# Patient Record
Sex: MALE | Race: WHITE | Employment: UNEMPLOYED | URBAN - METROPOLITAN AREA
[De-identification: names, ages, dates, MRNs, and addresses within clinical notes are randomized per-mention and may not be internally consistent; named-entity substitution may affect disease eponyms.]

---

## 2024-10-30 ENCOUNTER — HOSPITAL ENCOUNTER (EMERGENCY)
Age: 24
Discharge: HOME | End: 2024-10-30
Attending: EMERGENCY MEDICINE

## 2024-10-30 VITALS
BODY MASS INDEX: 32.45 KG/M2 | WEIGHT: 261 LBS | OXYGEN SATURATION: 96 % | HEART RATE: 72 BPM | DIASTOLIC BLOOD PRESSURE: 87 MMHG | TEMPERATURE: 98.1 F | HEIGHT: 75 IN | RESPIRATION RATE: 16 BRPM | SYSTOLIC BLOOD PRESSURE: 134 MMHG

## 2024-10-30 DIAGNOSIS — R51.9 NONINTRACTABLE HEADACHE, UNSPECIFIED CHRONICITY PATTERN, UNSPECIFIED HEADACHE TYPE: Primary | ICD-10-CM

## 2024-10-30 PROCEDURE — 96374 THER/PROPH/DIAG INJ IV PUSH: CPT

## 2024-10-30 PROCEDURE — 96375 TX/PRO/DX INJ NEW DRUG ADDON: CPT

## 2024-10-30 PROCEDURE — 99284 EMERGENCY DEPT VISIT MOD MDM: CPT

## 2024-10-30 PROCEDURE — 6360000002 HC RX W HCPCS: Performed by: EMERGENCY MEDICINE

## 2024-10-30 RX ORDER — KETOROLAC TROMETHAMINE 15 MG/ML
15 INJECTION, SOLUTION INTRAMUSCULAR; INTRAVENOUS ONCE
Status: COMPLETED | OUTPATIENT
Start: 2024-10-30 | End: 2024-10-30

## 2024-10-30 RX ORDER — PROCHLORPERAZINE EDISYLATE 5 MG/ML
10 INJECTION INTRAMUSCULAR; INTRAVENOUS ONCE
Status: COMPLETED | OUTPATIENT
Start: 2024-10-30 | End: 2024-10-30

## 2024-10-30 RX ORDER — DIPHENHYDRAMINE HYDROCHLORIDE 50 MG/ML
25 INJECTION INTRAMUSCULAR; INTRAVENOUS ONCE
Status: COMPLETED | OUTPATIENT
Start: 2024-10-30 | End: 2024-10-30

## 2024-10-30 RX ADMIN — DIPHENHYDRAMINE HYDROCHLORIDE 25 MG: 50 INJECTION INTRAMUSCULAR; INTRAVENOUS at 14:00

## 2024-10-30 RX ADMIN — PROCHLORPERAZINE EDISYLATE 10 MG: 5 INJECTION INTRAMUSCULAR; INTRAVENOUS at 14:00

## 2024-10-30 RX ADMIN — KETOROLAC TROMETHAMINE 15 MG: 15 INJECTION, SOLUTION INTRAMUSCULAR; INTRAVENOUS at 14:00

## 2024-10-30 ASSESSMENT — PAIN - FUNCTIONAL ASSESSMENT: PAIN_FUNCTIONAL_ASSESSMENT: 0-10

## 2024-10-30 ASSESSMENT — ENCOUNTER SYMPTOMS
DIARRHEA: 0
PHOTOPHOBIA: 0
ABDOMINAL PAIN: 0
NAUSEA: 1
TROUBLE SWALLOWING: 0
SHORTNESS OF BREATH: 0
COLOR CHANGE: 0
COUGH: 0
VOMITING: 1

## 2024-10-30 ASSESSMENT — PAIN DESCRIPTION - LOCATION: LOCATION: HEAD

## 2024-10-30 ASSESSMENT — PAIN SCALES - GENERAL: PAINLEVEL_OUTOF10: 10

## 2024-10-30 ASSESSMENT — PAIN DESCRIPTION - DESCRIPTORS: DESCRIPTORS: ACHING

## 2024-10-30 NOTE — ED PROVIDER NOTES
EMERGENCY DEPARTMENT ENCOUNTER    Pt Name: Ambrocio Chi  MRN: [id number]  Birthdate 2000  Date of evaluation: 10/30/24  CHIEF COMPLAINT       Chief Complaint   Patient presents with    Nausea    Vomiting     Symptoms started yesterday.     Headache     HISTORY OF PRESENT ILLNESS   24-year-old male presenting to the ER complaining of nausea vomiting and a headache that started yesterday.  Patient does have a history of recurrent headaches that present like this.  Patient denies any underlying medical issues aside from psychiatric illnesses.    The history is provided by the patient.   Headache  Pain location:  Generalized  Associated symptoms: nausea and vomiting    Associated symptoms: no abdominal pain, no congestion, no cough, no diarrhea, no dizziness, no ear pain, no fatigue, no fever, no myalgias and no photophobia            REVIEW OF SYSTEMS     Review of Systems   Constitutional:  Negative for activity change, fatigue and fever.   HENT:  Negative for congestion, ear pain and trouble swallowing.    Eyes:  Negative for photophobia and visual disturbance.   Respiratory:  Negative for cough and shortness of breath.    Cardiovascular:  Negative for chest pain and palpitations.   Gastrointestinal:  Positive for nausea and vomiting. Negative for abdominal pain and diarrhea.   Genitourinary:  Negative for dysuria, flank pain and urgency.   Musculoskeletal:  Negative for arthralgias and myalgias.   Skin:  Negative for color change and rash.   Neurological:  Positive for headaches. Negative for dizziness and facial asymmetry.   Psychiatric/Behavioral:  Negative for agitation and behavioral problems.      PASTMEDICAL HISTORY   No past medical history on file.  Past Problem List  There is no problem list on file for this patient.    SURGICAL HISTORY     No past surgical history on file.  CURRENT MEDICATIONS       Previous Medications    No medications on file     ALLERGIES     has No Known Allergies.  FAMILY HISTORY

## 2024-10-30 NOTE — ED NOTES
Pt presenting to the ED with complaints of a headache. PT reports that he does get headaches \"often\"

## 2024-11-02 ENCOUNTER — APPOINTMENT (OUTPATIENT)
Dept: GENERAL RADIOLOGY | Age: 24
End: 2024-11-02

## 2024-11-02 ENCOUNTER — HOSPITAL ENCOUNTER (EMERGENCY)
Age: 24
Discharge: HOME | End: 2024-11-02
Attending: EMERGENCY MEDICINE

## 2024-11-02 VITALS
TEMPERATURE: 98.3 F | RESPIRATION RATE: 18 BRPM | DIASTOLIC BLOOD PRESSURE: 86 MMHG | SYSTOLIC BLOOD PRESSURE: 153 MMHG | HEART RATE: 90 BPM | OXYGEN SATURATION: 93 %

## 2024-11-02 DIAGNOSIS — J06.9 VIRAL URI: Primary | ICD-10-CM

## 2024-11-02 LAB
FLUAV RNA RESP QL NAA+PROBE: NOT DETECTED
FLUBV RNA RESP QL NAA+PROBE: NOT DETECTED
SARS-COV-2 RNA RESP QL NAA+PROBE: NOT DETECTED
SOURCE: NORMAL
SPECIMEN DESCRIPTION: NORMAL
SPECIMEN SOURCE: NORMAL
STREP A, MOLECULAR: NEGATIVE

## 2024-11-02 PROCEDURE — 6360000002 HC RX W HCPCS: Performed by: EMERGENCY MEDICINE

## 2024-11-02 PROCEDURE — 87651 STREP A DNA AMP PROBE: CPT

## 2024-11-02 PROCEDURE — 99284 EMERGENCY DEPT VISIT MOD MDM: CPT

## 2024-11-02 PROCEDURE — 6370000000 HC RX 637 (ALT 250 FOR IP): Performed by: EMERGENCY MEDICINE

## 2024-11-02 PROCEDURE — 71045 X-RAY EXAM CHEST 1 VIEW: CPT

## 2024-11-02 PROCEDURE — 87636 SARSCOV2 & INF A&B AMP PRB: CPT

## 2024-11-02 RX ORDER — NICOTINE 21 MG/24HR
1 PATCH, TRANSDERMAL 24 HOURS TRANSDERMAL ONCE
Status: DISCONTINUED | OUTPATIENT
Start: 2024-11-02 | End: 2024-11-02 | Stop reason: HOSPADM

## 2024-11-02 RX ORDER — LIDOCAINE 4 G/G
1 PATCH TOPICAL DAILY
Status: DISCONTINUED | OUTPATIENT
Start: 2024-11-02 | End: 2024-11-02 | Stop reason: HOSPADM

## 2024-11-02 RX ORDER — DEXAMETHASONE SODIUM PHOSPHATE 10 MG/ML
10 INJECTION, SOLUTION INTRAMUSCULAR; INTRAVENOUS ONCE
Status: COMPLETED | OUTPATIENT
Start: 2024-11-02 | End: 2024-11-02

## 2024-11-02 RX ADMIN — DEXAMETHASONE SODIUM PHOSPHATE 10 MG: 10 INJECTION, SOLUTION INTRAMUSCULAR; INTRAVENOUS at 10:14

## 2024-11-02 ASSESSMENT — PAIN - FUNCTIONAL ASSESSMENT: PAIN_FUNCTIONAL_ASSESSMENT: 0-10

## 2024-11-02 ASSESSMENT — PAIN SCALES - GENERAL: PAINLEVEL_OUTOF10: 10

## 2024-11-02 NOTE — DISCHARGE INSTRUCTIONS
Keep self well-hydrated, use Tylenol and ibuprofen as needed.  If you have any concerning symptoms come back to the ER.

## 2024-11-02 NOTE — ED PROVIDER NOTES
EMERGENCY DEPARTMENT ENCOUNTER    Pt Name: Ambrocio Hill  MRN: [id number]  Birthdate 2000  Date of evaluation: 11/2/24  CHIEF COMPLAINT       Chief Complaint   Patient presents with    Nasal Congestion    Pain     Pt c/o all over body pain from being homeless until 2 weeks ago. Pt at [facility] and does not want to be there anymore. Pt wants to go to [facility] for rehab.      HISTORY OF PRESENT ILLNESS   HPI  24-year-old male with a history of polysubstance abuse currently in recovery presents to the ED for body aches, sore throat, occasionally productive cough for the past few days.  He denies chest pain, shortness of breath, fever/chills, nausea/vomiting or any other acute complaints.    REVIEW OF SYSTEMS     Review of Systems   All other systems reviewed and are negative.    PASTMEDICAL HISTORY   History reviewed. No pertinent past medical history.  SURGICAL HISTORY     History reviewed. No pertinent surgical history.  CURRENT MEDICATIONS     There are no discharge medications for this patient.    ALLERGIES     is allergic to benadryl [diphenhydramine].  FAMILY HISTORY     has no family status information on file.      SOCIAL HISTORY       Social History     Tobacco Use    Smoking status: Every Day     Types: Cigarettes    Smokeless tobacco: Current   Substance Use Topics    Drug use: Not Currently     Types: Cocaine, Methamphetamines (Crystal Meth), Opiates , Marijuana (Weed), IV     Comment: last used November 2023     PHYSICAL EXAM     INITIAL VITALS: BP (!) 153/86   Pulse 90   Temp 98.3 °F (36.8 °C)   Resp 18   SpO2 93%    Physical Exam  Constitutional:       General: He is not in acute distress.     Appearance: Normal appearance. He is normal weight. He is not ill-appearing.   HENT:      Head: Normocephalic and atraumatic.      Right Ear: Tympanic membrane normal.      Left Ear: Tympanic membrane normal.      Nose: Nose normal. No rhinorrhea.      Mouth/Throat:      Mouth: Mucous membranes are moist.

## 2024-11-30 ENCOUNTER — APPOINTMENT (OUTPATIENT)
Dept: GENERAL RADIOLOGY | Age: 24
End: 2024-11-30
Payer: MEDICAID

## 2024-11-30 ENCOUNTER — HOSPITAL ENCOUNTER (EMERGENCY)
Age: 24
Discharge: HOME | End: 2024-11-30
Attending: EMERGENCY MEDICINE
Payer: MEDICAID

## 2024-11-30 VITALS
DIASTOLIC BLOOD PRESSURE: 81 MMHG | TEMPERATURE: 97.4 F | WEIGHT: 260 LBS | SYSTOLIC BLOOD PRESSURE: 151 MMHG | HEART RATE: 63 BPM | RESPIRATION RATE: 19 BRPM | OXYGEN SATURATION: 98 % | BODY MASS INDEX: 32.5 KG/M2

## 2024-11-30 DIAGNOSIS — B35.3 TINEA PEDIS OF BOTH FEET: ICD-10-CM

## 2024-11-30 DIAGNOSIS — R07.89 CHEST DISCOMFORT: Primary | ICD-10-CM

## 2024-11-30 LAB
BILIRUB UR QL STRIP: NEGATIVE
CLARITY UR: CLEAR
COLOR UR: YELLOW
COMMENT: ABNORMAL
GLUCOSE UR STRIP-MCNC: NEGATIVE MG/DL
HGB UR QL STRIP.AUTO: NEGATIVE
KETONES UR STRIP-MCNC: NEGATIVE MG/DL
LEUKOCYTE ESTERASE UR QL STRIP: NEGATIVE
NITRITE UR QL STRIP: NEGATIVE
PH UR STRIP: 6 [PH] (ref 5–8)
PROT UR STRIP-MCNC: NEGATIVE MG/DL
SP GR UR STRIP: 1 (ref 1–1.03)
UROBILINOGEN UR STRIP-ACNC: NORMAL EU/DL (ref 0–1)

## 2024-11-30 PROCEDURE — 93005 ELECTROCARDIOGRAM TRACING: CPT

## 2024-11-30 PROCEDURE — 99285 EMERGENCY DEPT VISIT HI MDM: CPT

## 2024-11-30 PROCEDURE — 6360000002 HC RX W HCPCS

## 2024-11-30 PROCEDURE — 81003 URINALYSIS AUTO W/O SCOPE: CPT

## 2024-11-30 PROCEDURE — 6370000000 HC RX 637 (ALT 250 FOR IP)

## 2024-11-30 PROCEDURE — 71046 X-RAY EXAM CHEST 2 VIEWS: CPT

## 2024-11-30 RX ORDER — ACETAMINOPHEN 500 MG
1000 TABLET ORAL ONCE
Status: COMPLETED | OUTPATIENT
Start: 2024-11-30 | End: 2024-11-30

## 2024-11-30 RX ORDER — DEXAMETHASONE 4 MG/1
4 TABLET ORAL ONCE
Status: COMPLETED | OUTPATIENT
Start: 2024-11-30 | End: 2024-11-30

## 2024-11-30 RX ORDER — CLOTRIMAZOLE 1 %
CREAM (GRAM) TOPICAL
Qty: 28 G | Refills: 0 | Status: SHIPPED | OUTPATIENT
Start: 2024-11-30 | End: 2024-12-07

## 2024-11-30 RX ORDER — MICONAZOLE NITRATE 20 MG/G
CREAM TOPICAL 2 TIMES DAILY
Status: DISCONTINUED | OUTPATIENT
Start: 2024-11-30 | End: 2024-11-30 | Stop reason: HOSPADM

## 2024-11-30 RX ORDER — CLOTRIMAZOLE 1 %
CREAM (GRAM) TOPICAL 2 TIMES DAILY
Status: DISCONTINUED | OUTPATIENT
Start: 2024-11-30 | End: 2024-11-30 | Stop reason: SDUPTHER

## 2024-11-30 RX ADMIN — MICONAZOLE NITRATE: 20 CREAM TOPICAL at 20:12

## 2024-11-30 RX ADMIN — ACETAMINOPHEN 1000 MG: 500 TABLET ORAL at 19:02

## 2024-11-30 RX ADMIN — DEXAMETHASONE 4 MG: 4 TABLET ORAL at 19:02

## 2024-11-30 ASSESSMENT — PAIN DESCRIPTION - LOCATION: LOCATION: SHOULDER

## 2024-11-30 ASSESSMENT — PAIN DESCRIPTION - PAIN TYPE: TYPE: ACUTE PAIN

## 2024-11-30 ASSESSMENT — PAIN DESCRIPTION - DESCRIPTORS: DESCRIPTORS: SORE

## 2024-11-30 ASSESSMENT — PAIN SCALES - GENERAL: PAINLEVEL_OUTOF10: 5

## 2024-11-30 ASSESSMENT — PAIN DESCRIPTION - FREQUENCY: FREQUENCY: CONTINUOUS

## 2024-11-30 ASSESSMENT — PAIN DESCRIPTION - ORIENTATION: ORIENTATION: LEFT

## 2024-11-30 ASSESSMENT — PAIN - FUNCTIONAL ASSESSMENT: PAIN_FUNCTIONAL_ASSESSMENT: 0-10

## 2024-11-30 NOTE — ED PROVIDER NOTES
[facility] ED  Emergency Department Encounter  Emergency Medicine Resident     Pt Name:Ambrocio Hill  MRN: [id number]  Birthdate 2000  Date of evaluation: 11/30/24  PCP:  No primary care provider on file.  Note Started: 6:37 PM EST      CHIEF COMPLAINT       Chief Complaint   Patient presents with   • Shoulder Pain   • Rash       HISTORY OF PRESENT ILLNESS  (Location/Symptom, Timing/Onset, Context/Setting, Quality, Duration, Modifying Factors, Severity.)      Ambrocio Hill is a 24-year-old male who presents to the ED with complaints not consistent with his triage complaints.  Patient initially stated he was having shoulder pain and a rash but when physician was in the room, patient was complaining of generalized fatigue for the last several weeks and some shortness of breath with intermittent chest pain.  Patient has no cardiac history.  Denies any sharp acute chest pain or sudden onset shortness of breath.  Patient denies any recent sick contacts.  Denies any rash or change in skin color.  Denies any acute headache, vision changes, diaphoresis, fever, chills, nausea, vomiting, abdominal pain.  Patient did endorse mild dysuria symptoms.    Chart review shows the patient has home    PAST MEDICAL / SURGICAL / SOCIAL / FAMILY HISTORY      has no past medical history on file.       has no past surgical history on file.      Social History     Socioeconomic History   • Marital status: Single     Spouse name: Not on file   • Number of children: Not on file   • Years of education: Not on file   • Highest education level: Not on file   Occupational History   • Not on file   Tobacco Use   • Smoking status: Every Day     Types: Cigarettes   • Smokeless tobacco: Current   Substance and Sexual Activity   • Alcohol use: Not on file   • Drug use: Not Currently     Types: Cocaine, Methamphetamines (Crystal Meth), Opiates , Marijuana (Weed), IV     Comment: last used November 2023   • Sexual activity: Not on  of breath with intermittent chest pain.  Patient has no cardiac history.  Denies any sharp acute chest pain or sudden onset shortness of breath.  Patient denies any recent sick contacts.  Denies any rash or change in skin color.  Denies any acute headache, vision changes, diaphoresis, fever, chills, nausea, vomiting, abdominal pain.  Patient did endorse mild dysuria symptoms.    Chart review shows the patient has home    Physical Exam: GCS 15, A and O x 4, VSS except for mildly elevated systolic blood pressure 141/81.  Afebrile.  No acute distress.  Nontoxic-appearing.  Resting comfortably in bed.  Cranial nerves grossly intact.  No rash on visible skin.  Heart has a regular rate and rhythm.  Lungs are clear to auscultation.  Abdomen: S, ND, NTTP.  Palpable radial DP pulses.  No pitting edema.  No midline C/T or L-spine TTP.  No CVA TTP     Concern for: EKG abnormalities, pneumonia, reactive airway disease,, rash, viral URI    Plan/Impression: Will do an EKG and chest x-ray.  As patient has no cardiac history, low likelihood given patient's age that he has an acute coronary process.  Will give Tylenol for pain control.  Will also give a dose of Decadron for any itching.   [AS]   1937 On attending's evaluation, noted at least foot.  Will give clotrimazole cream here for patient to go home with and apply. [AS]   1948 FINDINGS:  Patient is slightly rotated.  Cardiomediastinal silhouette and pulmonary  vasculature are normal.  Suspected mild scarring in both lungs.  No  consolidation, pleural effusion, or pneumothorax.     IMPRESSION:  No acute abnormality.   [AS]   2046 Leukocyte Esterase, Urine: NEGATIVE [AS]   2046 Nitrite, Urine: NEGATIVE  Urinalysis negative for UTI.  Will discharge patient. [AS]      ED Course User Index  [AS] Ambrocio Byrnes DO       PROCEDURES:      CONSULTS:  None    CRITICAL CARE:  There was significant risk of life threatening deterioration of patient's condition requiring my direct

## 2024-11-30 NOTE — ED TRIAGE NOTES
Pt to ED c/o left shoulder pain and swelling and a rash to his bilateral feet x2 weeks. Pt denies any injury to his shoulder and states that it is stiff and feels like \"the lymph nodes are swollen.\" Pt states that he has been trying to use ibuprofen for his symptoms without relief.

## 2024-12-01 LAB
EKG ATRIAL RATE: 54 BPM
EKG P AXIS: 28 DEGREES
EKG P-R INTERVAL: 158 MS
EKG Q-T INTERVAL: 416 MS
EKG QRS DURATION: 88 MS
EKG QTC CALCULATION (BAZETT): 394 MS
EKG R AXIS: 15 DEGREES
EKG T AXIS: 19 DEGREES
EKG VENTRICULAR RATE: 54 BPM

## 2024-12-01 NOTE — ED PROVIDER NOTES
[facility]     Emergency Department     Faculty Attestation    I performed a history and physical examination of the patient and discussed management with the resident. I reviewed the resident’s note and agree with the documented findings including all diagnostic interpretations and plan of care. Any areas of disagreement are noted on the chart. I was personally present for the key portions of any procedures. I have documented in the chart those procedures where I was not present during the key portions. I have reviewed the emergency nurses triage note. I agree with the chief complaint, past medical history, past surgical history, allergies, medications, social and family history as documented unless otherwise noted below. Documentation of the HPI, Physical Exam and Medical Decision Making performed by prema is based on my personal performance of the HPI, PE and MDM. For Physician Assistant/ Nurse Practitioner cases/documentation I have personally evaluated this patient and have completed at least one if not all key elements of the E/M (history, physical exam, and MDM). Additional findings are as noted.    Primary Care Physician: No primary care provider on file.    Note Started: 8:50 PM EST     VITAL SIGNS:   weight is 117.9 kg (260 lb). His oral temperature is 97.4 °F (36.3 °C). His blood pressure is 151/81 (abnormal) and his pulse is 63. His respiration is 19 and oxygen saturation is 98%.      Medical Decision Making  Left shoulder/chest discomfort.  Reports has been ongoing for 2 weeks.  No nausea no vomiting no shortness of breath.  No direct trauma.  No increased pain with range of motion.  Neurovascular intact to the arm.  He also complains of discomfort in the feet bilaterally.  There is some erythema and scaling of the skin to suggest tinea pedis.  EKG, chest x-ray, clotrimazole    Amount and/or Complexity of Data Reviewed  Labs: ordered.

## 2024-12-01 NOTE — ED NOTES
Report received from   Marisol ANGULO    Pt resting in bed. NAD noted.   Waiting further orders to plan of care

## 2024-12-01 NOTE — DISCHARGE INSTRUCTIONS
-You were seen and evaluated by emergency medicine physicians at [facility].    -Please follow-up with your primary care physician and/or with the referrals to specialist.    -You were diagnosed with: Chest discomfort, athlete's foot    -Lab work was negative.  No signs of abnormal electrical changes on EKG.  Chest x-ray was normal.  You will be discharged with a prescription of Lotrimin cream for the athlete's foot.  Please apply as directed.  Please follow-up with your primary care physician within 1 week.  If you do not have 1, please use the referral.    -Please return to the Emergency Department if you are experiencing the following symptoms acutely: Headache, fever, chills, nausea, vomiting, chest pain, shortness of breath, abdominal pain, change with urination, change with bowel movements, change in your skin/hair/nail, weakness, fatigue, altered mental status and/or any change from baseline health.    -Thank you for coming to [facility].    
no

## 2024-12-02 PROCEDURE — 93010 ELECTROCARDIOGRAM REPORT: CPT | Performed by: INTERNAL MEDICINE

## 2025-01-27 ENCOUNTER — HOSPITAL ENCOUNTER (EMERGENCY)
Age: 25
Discharge: HOME | End: 2025-01-27
Attending: EMERGENCY MEDICINE
Payer: MEDICAID

## 2025-01-27 VITALS
DIASTOLIC BLOOD PRESSURE: 96 MMHG | RESPIRATION RATE: 18 BRPM | OXYGEN SATURATION: 97 % | TEMPERATURE: 97.3 F | HEART RATE: 88 BPM | SYSTOLIC BLOOD PRESSURE: 147 MMHG

## 2025-01-27 DIAGNOSIS — J02.9 VIRAL PHARYNGITIS: Primary | ICD-10-CM

## 2025-01-27 PROCEDURE — 6370000000 HC RX 637 (ALT 250 FOR IP)

## 2025-01-27 PROCEDURE — 99283 EMERGENCY DEPT VISIT LOW MDM: CPT

## 2025-01-27 RX ORDER — ACETAMINOPHEN 500 MG
500 TABLET ORAL 4 TIMES DAILY PRN
Qty: 40 TABLET | Refills: 0 | Status: SHIPPED | OUTPATIENT
Start: 2025-01-27 | End: 2025-02-06

## 2025-01-27 RX ORDER — HYDROXYZINE HYDROCHLORIDE 25 MG/1
25 TABLET, FILM COATED ORAL
COMMUNITY
Start: 2024-11-05 | End: 2025-01-27

## 2025-01-27 RX ORDER — CETIRIZINE HYDROCHLORIDE 10 MG/1
10 TABLET ORAL ONCE
Status: COMPLETED | OUTPATIENT
Start: 2025-01-27 | End: 2025-01-27

## 2025-01-27 RX ORDER — PRAZOSIN HYDROCHLORIDE 1 MG/1
1 CAPSULE ORAL
COMMUNITY
Start: 2024-11-11

## 2025-01-27 RX ORDER — LAMOTRIGINE 25 MG/1
25 TABLET ORAL
COMMUNITY
Start: 2024-11-11

## 2025-01-27 RX ORDER — QUETIAPINE FUMARATE 300 MG/1
300 TABLET, FILM COATED ORAL
COMMUNITY
Start: 2024-11-11

## 2025-01-27 RX ORDER — CETIRIZINE HYDROCHLORIDE 10 MG/1
10 TABLET ORAL DAILY
Qty: 30 TABLET | Refills: 0 | Status: SHIPPED | OUTPATIENT
Start: 2025-01-27

## 2025-01-27 RX ORDER — IBUPROFEN 400 MG/1
400 TABLET, FILM COATED ORAL ONCE
Status: COMPLETED | OUTPATIENT
Start: 2025-01-27 | End: 2025-01-27

## 2025-01-27 RX ORDER — ARIPIPRAZOLE 400 MG
400 KIT INTRAMUSCULAR
COMMUNITY
Start: 2024-11-07

## 2025-01-27 RX ORDER — ACETAMINOPHEN 500 MG
1000 TABLET ORAL ONCE
Status: COMPLETED | OUTPATIENT
Start: 2025-01-27 | End: 2025-01-27

## 2025-01-27 RX ORDER — IBUPROFEN 200 MG
600 TABLET ORAL EVERY 8 HOURS PRN
Qty: 30 TABLET | Refills: 0 | Status: SHIPPED | OUTPATIENT
Start: 2025-01-27

## 2025-01-27 RX ADMIN — BENZOCAINE AND MENTHOL 1 LOZENGE: 15; 3.6 LOZENGE ORAL at 04:51

## 2025-01-27 RX ADMIN — ACETAMINOPHEN 1000 MG: 500 TABLET ORAL at 04:51

## 2025-01-27 RX ADMIN — IBUPROFEN 400 MG: 400 TABLET, FILM COATED ORAL at 04:51

## 2025-01-27 RX ADMIN — CETIRIZINE HYDROCHLORIDE 10 MG: 10 TABLET, FILM COATED ORAL at 04:51

## 2025-01-27 ASSESSMENT — PAIN - FUNCTIONAL ASSESSMENT: PAIN_FUNCTIONAL_ASSESSMENT: 0-10

## 2025-01-27 ASSESSMENT — PAIN SCALES - GENERAL: PAINLEVEL_OUTOF10: 8

## 2025-01-27 NOTE — ED PROVIDER NOTES
[facility]   Emergency Department  Faculty Attestation       I performed a history and physical examination of the patient and discussed management with the resident. I reviewed the resident’s note and agree with the documented findings including all diagnostic interpretations and plan of care. Any areas of disagreement are noted on the chart. I was personally present for the key portions of any procedures. I have documented in the chart those procedures where I was not present during the key portions. I have reviewed the emergency nurses triage note. I agree with the chief complaint, past medical history, past surgical history, allergies, medications, social and family history as documented unless otherwise noted below.  For Physician Assistant/ Nurse Practitioner cases/documentation I have personally evaluated this patient and have completed at least one if not all key elements of the E/M (history, physical exam, and MDM). Additional findings are as noted.    Patient Name: Ambrocio Hill  MRN: [id number]  : 2000  Primary Care Physician: No primary care provider on file.    Date of evaluationa: 2025   Note Started: 4:56 AM EST    Pertinent Comments     Chief Complaint:   Chief Complaint   Patient presents with    Pharyngitis    Cough        Initial vitals: (If not listed, please see nursing documentation)  ED Triage Vitals   BP Systolic BP Percentile Diastolic BP Percentile Temp Temp Source Pulse Respirations SpO2   25 0349 -- -- 25 0347 25 0347 25 0349 25 0347 25 0349   (!) 147/96   97.8 °F (36.6 °C) Axillary 88 18 97 %      Height Weight         -- --                        HPI/PE/Impression:  This is a 24 y.o. male who presents to the Emergency Department Sore throat and cough for several days.  On examination, he is awake alert answering questions.  Posterior pharynx is mild erythema but no exudate.  No cervical adenopathy.  Heart sounds regular.  
(CEPACOL) 6-10 MG LOZG lozenge Take 1 lozenge by mouth every 2 hours as needed for Sore Throat, Disp-30 lozenge, R-0Normal      cetirizine (ZYRTEC) 10 MG tablet Take 1 tablet by mouth daily, Disp-30 tablet, R-0Normal      ibuprofen (ADVIL;MOTRIN) 200 MG tablet Take 3 tablets by mouth every 8 hours as needed for Pain or Fever, Disp-30 tablet, R-0Normal             Ambrocio Byrnes DO  Emergency Medicine Resident    (Please note that portions of this note were completed with a voice recognition program.  Efforts were made to edit the dictations but occasionally words are mis-transcribed.)

## 2025-01-27 NOTE — DISCHARGE INSTRUCTIONS
-You were seen and evaluated by emergency medicine physicians at [facility].    -Please follow-up with your primary care physician and/or with the referrals to specialist.    -You were diagnosed with: Viral pharyngitis    -Physical exam showed concerns for a viral infection of your throat.  You were given symptomatic control.  Viral infections typically run between 7 to 10 days.  Prescriptions of medications for symptom control have been sent to your pharmacy.  Please follow-up with your primary care physician.  If you do not have 1, please use the referral.    -Please return to the Emergency Department if you are experiencing the following symptoms acutely: Sore throat, difficulty swallowing, difficulty tolerating your own secretions, swelling of your neck, difficulty breathing, stridor, Headache, fever, chills, nausea, vomiting, chest pain, shortness of breath, abdominal pain, change with urination, change with bowel movements, change in your skin/hair/nail, weakness, fatigue, altered mental status and/or any change from baseline health.    -Thank you for coming to [facility].

## 2025-01-27 NOTE — ED NOTES
Pt. Presents to the ED for a sore throat and cough. Pt has no other complaints. Resp even and non labored. Pt a&ox4. Resident at the bedside for evaluation.

## 2025-02-14 ENCOUNTER — HOSPITAL ENCOUNTER (INPATIENT)
Age: 25
LOS: 5 days | Discharge: HOME | DRG: 761 | End: 2025-02-19
Attending: EMERGENCY MEDICINE | Admitting: PSYCHIATRY & NEUROLOGY
Payer: MEDICAID

## 2025-02-14 DIAGNOSIS — F32.A DEPRESSION WITH SUICIDAL IDEATION: Primary | ICD-10-CM

## 2025-02-14 DIAGNOSIS — R45.851 DEPRESSION WITH SUICIDAL IDEATION: Primary | ICD-10-CM

## 2025-02-14 DIAGNOSIS — F19.10 POLYSUBSTANCE ABUSE (HCC): ICD-10-CM

## 2025-02-14 PROBLEM — F23 ACUTE PSYCHOSIS (HCC): Status: ACTIVE | Noted: 2025-02-14

## 2025-02-14 LAB
ALBUMIN SERPL-MCNC: 4.5 G/DL (ref 3.5–5.2)
ALP SERPL-CCNC: 116 U/L (ref 40–129)
ALT SERPL-CCNC: 63 U/L (ref 10–50)
AMPHET UR QL SCN: NEGATIVE
ANION GAP SERPL CALCULATED.3IONS-SCNC: 12 MMOL/L (ref 9–16)
AST SERPL-CCNC: 36 U/L (ref 10–50)
BARBITURATES UR QL SCN: NEGATIVE
BASOPHILS # BLD: 0.1 K/UL (ref 0–0.2)
BASOPHILS NFR BLD: 1 % (ref 0–2)
BENZODIAZ UR QL: NEGATIVE
BILIRUB SERPL-MCNC: 0.5 MG/DL (ref 0–1.2)
BUN SERPL-MCNC: 12 MG/DL (ref 6–20)
CALCIUM SERPL-MCNC: 9.5 MG/DL (ref 8.6–10.4)
CANNABINOIDS UR QL SCN: POSITIVE
CHLORIDE SERPL-SCNC: 103 MMOL/L (ref 98–107)
CO2 SERPL-SCNC: 26 MMOL/L (ref 20–31)
COCAINE UR QL SCN: NEGATIVE
CREAT SERPL-MCNC: 0.9 MG/DL (ref 0.7–1.2)
EOSINOPHIL # BLD: 0.2 K/UL (ref 0–0.4)
EOSINOPHILS RELATIVE PERCENT: 2 % (ref 0–4)
ERYTHROCYTE [DISTWIDTH] IN BLOOD BY AUTOMATED COUNT: 14.7 % (ref 11.5–14.9)
ETHANOL PERCENT: NORMAL %
ETHANOLAMINE SERPL-MCNC: <10 MG/DL (ref 0–0.08)
FENTANYL UR QL: NEGATIVE
GFR, ESTIMATED: >90 ML/MIN/1.73M2
GLUCOSE SERPL-MCNC: 100 MG/DL (ref 74–99)
HCT VFR BLD AUTO: 46.9 % (ref 41–53)
HGB BLD-MCNC: 16 G/DL (ref 13.5–17.5)
LYMPHOCYTES NFR BLD: 1.5 K/UL (ref 1–4.8)
LYMPHOCYTES RELATIVE PERCENT: 12 % (ref 24–44)
MCH RBC QN AUTO: 29 PG (ref 26–34)
MCHC RBC AUTO-ENTMCNC: 34.2 G/DL (ref 31–37)
MCV RBC AUTO: 84.9 FL (ref 80–100)
METHADONE UR QL: NEGATIVE
MONOCYTES NFR BLD: 1.2 K/UL (ref 0.1–1.3)
MONOCYTES NFR BLD: 10 % (ref 1–7)
NEUTROPHILS NFR BLD: 75 % (ref 36–66)
NEUTS SEG NFR BLD: 8.8 K/UL (ref 1.3–9.1)
OPIATES UR QL SCN: NEGATIVE
OXYCODONE UR QL SCN: NEGATIVE
PCP UR QL SCN: NEGATIVE
PLATELET # BLD AUTO: 275 K/UL (ref 150–450)
PMV BLD AUTO: 7.3 FL (ref 6–12)
POTASSIUM SERPL-SCNC: 4 MMOL/L (ref 3.7–5.3)
PROT SERPL-MCNC: 7.3 G/DL (ref 6.6–8.7)
RBC # BLD AUTO: 5.53 M/UL (ref 4.5–5.9)
SODIUM SERPL-SCNC: 141 MMOL/L (ref 136–145)
TEST INFORMATION: ABNORMAL
WBC OTHER # BLD: 11.8 K/UL (ref 3.5–11)

## 2025-02-14 PROCEDURE — 36415 COLL VENOUS BLD VENIPUNCTURE: CPT

## 2025-02-14 PROCEDURE — 99285 EMERGENCY DEPT VISIT HI MDM: CPT

## 2025-02-14 PROCEDURE — 80053 COMPREHEN METABOLIC PANEL: CPT

## 2025-02-14 PROCEDURE — 1240000000 HC EMOTIONAL WELLNESS R&B

## 2025-02-14 PROCEDURE — 6370000000 HC RX 637 (ALT 250 FOR IP): Performed by: PSYCHIATRY & NEUROLOGY

## 2025-02-14 PROCEDURE — G0480 DRUG TEST DEF 1-7 CLASSES: HCPCS

## 2025-02-14 PROCEDURE — 80307 DRUG TEST PRSMV CHEM ANLYZR: CPT

## 2025-02-14 PROCEDURE — 85025 COMPLETE CBC W/AUTO DIFF WBC: CPT

## 2025-02-14 RX ORDER — HALOPERIDOL 5 MG/1
5 TABLET ORAL EVERY 6 HOURS PRN
Status: DISCONTINUED | OUTPATIENT
Start: 2025-02-14 | End: 2025-02-19 | Stop reason: HOSPADM

## 2025-02-14 RX ORDER — QUETIAPINE FUMARATE 300 MG/1
300 TABLET, FILM COATED ORAL NIGHTLY
Status: DISCONTINUED | OUTPATIENT
Start: 2025-02-14 | End: 2025-02-15

## 2025-02-14 RX ORDER — POLYETHYLENE GLYCOL 3350 17 G
2 POWDER IN PACKET (EA) ORAL
Status: DISCONTINUED | OUTPATIENT
Start: 2025-02-14 | End: 2025-02-15

## 2025-02-14 RX ORDER — ACETAMINOPHEN 325 MG/1
650 TABLET ORAL EVERY 6 HOURS PRN
Status: DISCONTINUED | OUTPATIENT
Start: 2025-02-14 | End: 2025-02-19 | Stop reason: HOSPADM

## 2025-02-14 RX ORDER — POLYETHYLENE GLYCOL 3350 17 G/17G
17 POWDER, FOR SOLUTION ORAL DAILY PRN
Status: DISCONTINUED | OUTPATIENT
Start: 2025-02-14 | End: 2025-02-19 | Stop reason: HOSPADM

## 2025-02-14 RX ORDER — TRAZODONE HYDROCHLORIDE 50 MG/1
50 TABLET ORAL NIGHTLY PRN
Status: DISCONTINUED | OUTPATIENT
Start: 2025-02-15 | End: 2025-02-19 | Stop reason: HOSPADM

## 2025-02-14 RX ORDER — HYDROXYZINE HYDROCHLORIDE 50 MG/1
50 TABLET, FILM COATED ORAL 3 TIMES DAILY PRN
Status: DISCONTINUED | OUTPATIENT
Start: 2025-02-14 | End: 2025-02-19 | Stop reason: HOSPADM

## 2025-02-14 RX ORDER — MAGNESIUM HYDROXIDE/ALUMINUM HYDROXICE/SIMETHICONE 120; 1200; 1200 MG/30ML; MG/30ML; MG/30ML
30 SUSPENSION ORAL EVERY 6 HOURS PRN
Status: DISCONTINUED | OUTPATIENT
Start: 2025-02-14 | End: 2025-02-19 | Stop reason: HOSPADM

## 2025-02-14 RX ORDER — IBUPROFEN 400 MG/1
400 TABLET, FILM COATED ORAL EVERY 6 HOURS PRN
Status: DISCONTINUED | OUTPATIENT
Start: 2025-02-14 | End: 2025-02-19 | Stop reason: HOSPADM

## 2025-02-14 RX ORDER — LORAZEPAM 1 MG/1
2 TABLET ORAL EVERY 6 HOURS PRN
Status: DISCONTINUED | OUTPATIENT
Start: 2025-02-14 | End: 2025-02-19 | Stop reason: HOSPADM

## 2025-02-14 RX ADMIN — QUETIAPINE FUMARATE 300 MG: 300 TABLET ORAL at 22:08

## 2025-02-14 RX ADMIN — NICOTINE POLACRILEX 2 MG: 2 LOZENGE ORAL at 22:08

## 2025-02-14 ASSESSMENT — PAIN DESCRIPTION - ORIENTATION: ORIENTATION: RIGHT;LEFT

## 2025-02-14 ASSESSMENT — PAIN DESCRIPTION - LOCATION: LOCATION: FOOT

## 2025-02-14 ASSESSMENT — PATIENT HEALTH QUESTIONNAIRE - PHQ9
SUM OF ALL RESPONSES TO PHQ9 QUESTIONS 1 & 2: 2
SUM OF ALL RESPONSES TO PHQ QUESTIONS 1-9: 2
1. LITTLE INTEREST OR PLEASURE IN DOING THINGS: SEVERAL DAYS
SUM OF ALL RESPONSES TO PHQ QUESTIONS 1-9: 2
2. FEELING DOWN, DEPRESSED OR HOPELESS: SEVERAL DAYS

## 2025-02-14 ASSESSMENT — LIFESTYLE VARIABLES
HOW MANY STANDARD DRINKS CONTAINING ALCOHOL DO YOU HAVE ON A TYPICAL DAY: PATIENT DOES NOT DRINK
HOW OFTEN DO YOU HAVE A DRINK CONTAINING ALCOHOL: NEVER
HOW OFTEN DO YOU HAVE A DRINK CONTAINING ALCOHOL: 4 OR MORE TIMES A WEEK
HOW MANY STANDARD DRINKS CONTAINING ALCOHOL DO YOU HAVE ON A TYPICAL DAY: 10 OR MORE

## 2025-02-14 ASSESSMENT — PAIN SCALES - GENERAL
PAINLEVEL_OUTOF10: 0
PAINLEVEL_OUTOF10: 10

## 2025-02-14 ASSESSMENT — SLEEP AND FATIGUE QUESTIONNAIRES
DO YOU HAVE DIFFICULTY SLEEPING: NO
DO YOU USE A SLEEP AID: NO
AVERAGE NUMBER OF SLEEP HOURS: 6

## 2025-02-14 ASSESSMENT — PAIN DESCRIPTION - FREQUENCY: FREQUENCY: INTERMITTENT

## 2025-02-14 ASSESSMENT — PAIN DESCRIPTION - PAIN TYPE: TYPE: ACUTE PAIN
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
## 2025-02-14 NOTE — ED PROVIDER NOTES
EMERGENCY DEPARTMENT ENCOUNTER    Pt Name: Ambrocio Hill  MRN: [id number]  Birthdate 2000  Date of evaluation: 2/14/25  CHIEF COMPLAINT       Chief Complaint   Patient presents with    Suicidal     HISTORY OF PRESENT ILLNESS   HPI  Suicidal thoughts.  Using excessive amount of drugs.  Sniffing Seroquel because he states it calms him down.  He has been walking around for 3 days with just socks on no shoes.  Walking indoors and outdoors.  Went to [facility] today.  They sent him over here for evaluation.      PASTMEDICAL HISTORY   No past medical history on file.  Past Problem List  There is no problem list on file for this patient.    SURGICAL HISTORY     No past surgical history on file.  CURRENT MEDICATIONS       Previous Medications    ABILIFY MAINTENA 400 MG SRER    400 mg    ACETAMINOPHEN (TYLENOL) 500 MG TABLET    Take 1 tablet by mouth 4 times daily as needed for Pain    CETIRIZINE (ZYRTEC) 10 MG TABLET    Take 1 tablet by mouth daily    IBUPROFEN (ADVIL;MOTRIN) 200 MG TABLET    Take 3 tablets by mouth every 8 hours as needed for Pain or Fever    LAMOTRIGINE (LAMICTAL) 25 MG TABLET    Take 1 tablet by mouth    PRAZOSIN (MINIPRESS) 1 MG CAPSULE    Take 1 capsule by mouth    QUETIAPINE (SEROQUEL) 300 MG TABLET    Take 1 tablet by mouth     ALLERGIES     is allergic to benadryl [diphenhydramine].  FAMILY HISTORY     has no family status information on file.      SOCIAL HISTORY       Social History     Tobacco Use    Smoking status: Every Day     Types: Cigarettes    Smokeless tobacco: Current   Substance Use Topics    Drug use: Not Currently     Types: Cocaine, Methamphetamines (Crystal Meth), Opiates , Marijuana (Weed), IV     Comment: last used November 2023     PHYSICAL EXAM     INITIAL VITALS: BP (!) 147/81   Pulse 98   Temp 97.3 °F (36.3 °C) (Oral)   Resp 20   SpO2 98%    Physical Exam  Constitutional:       General: He is not in acute distress.     Appearance: Normal appearance. He is well-developed.

## 2025-02-15 PROBLEM — R45.851 DEPRESSION WITH SUICIDAL IDEATION: Status: ACTIVE | Noted: 2025-02-15

## 2025-02-15 PROBLEM — F33.3 MAJOR DEPRESSIVE DISORDER, RECURRENT EPISODE, SEVERE, WITH PSYCHOSIS (HCC): Status: ACTIVE | Noted: 2025-02-15

## 2025-02-15 PROBLEM — R79.89 ABNORMAL LFTS: Status: ACTIVE | Noted: 2025-02-15

## 2025-02-15 PROBLEM — F25.0 SCHIZOAFFECTIVE DISORDER, BIPOLAR TYPE (HCC): Status: RESOLVED | Noted: 2025-02-15 | Resolved: 2025-02-15

## 2025-02-15 PROBLEM — F25.0 SCHIZOAFFECTIVE DISORDER, BIPOLAR TYPE (HCC): Status: ACTIVE | Noted: 2025-02-15

## 2025-02-15 PROBLEM — F33.3 MAJOR DEPRESSIVE DISORDER, RECURRENT EPISODE, SEVERE, WITH PSYCHOSIS (HCC): Status: RESOLVED | Noted: 2025-02-15 | Resolved: 2025-02-15

## 2025-02-15 PROBLEM — F32.A DEPRESSION WITH SUICIDAL IDEATION: Status: ACTIVE | Noted: 2025-02-15

## 2025-02-15 PROCEDURE — 1240000000 HC EMOTIONAL WELLNESS R&B

## 2025-02-15 PROCEDURE — 6370000000 HC RX 637 (ALT 250 FOR IP): Performed by: PSYCHIATRY & NEUROLOGY

## 2025-02-15 PROCEDURE — APPSS60 APP SPLIT SHARED TIME 46-60 MINUTES

## 2025-02-15 PROCEDURE — 99222 1ST HOSP IP/OBS MODERATE 55: CPT | Performed by: INTERNAL MEDICINE

## 2025-02-15 PROCEDURE — 99222 1ST HOSP IP/OBS MODERATE 55: CPT | Performed by: PSYCHIATRY & NEUROLOGY

## 2025-02-15 RX ORDER — POLYETHYLENE GLYCOL 3350 17 G
2 POWDER IN PACKET (EA) ORAL
Status: DISCONTINUED | OUTPATIENT
Start: 2025-02-15 | End: 2025-02-19 | Stop reason: HOSPADM

## 2025-02-15 RX ORDER — ARIPIPRAZOLE 5 MG/1
5 TABLET ORAL DAILY
Status: DISCONTINUED | OUTPATIENT
Start: 2025-02-15 | End: 2025-02-16

## 2025-02-15 RX ORDER — LAMOTRIGINE 25 MG/1
25 TABLET ORAL DAILY
Status: DISCONTINUED | OUTPATIENT
Start: 2025-02-15 | End: 2025-02-19 | Stop reason: HOSPADM

## 2025-02-15 RX ADMIN — ACETAMINOPHEN 650 MG: 325 TABLET ORAL at 16:28

## 2025-02-15 RX ADMIN — ARIPIPRAZOLE 5 MG: 5 TABLET ORAL at 18:24

## 2025-02-15 RX ADMIN — NICOTINE POLACRILEX 2 MG: 2 LOZENGE ORAL at 00:18

## 2025-02-15 RX ADMIN — NICOTINE POLACRILEX 2 MG: 2 LOZENGE ORAL at 14:31

## 2025-02-15 RX ADMIN — ACETAMINOPHEN 650 MG: 325 TABLET ORAL at 04:32

## 2025-02-15 RX ADMIN — NICOTINE POLACRILEX 2 MG: 2 LOZENGE ORAL at 16:02

## 2025-02-15 RX ADMIN — HYDROXYZINE HYDROCHLORIDE 50 MG: 50 TABLET, FILM COATED ORAL at 19:24

## 2025-02-15 RX ADMIN — NICOTINE POLACRILEX 2 MG: 2 LOZENGE ORAL at 07:19

## 2025-02-15 RX ADMIN — TRAZODONE HYDROCHLORIDE 50 MG: 50 TABLET ORAL at 23:08

## 2025-02-15 RX ADMIN — LAMOTRIGINE 25 MG: 25 TABLET ORAL at 18:24

## 2025-02-15 RX ADMIN — HYDROXYZINE HYDROCHLORIDE 50 MG: 50 TABLET, FILM COATED ORAL at 08:21

## 2025-02-15 RX ADMIN — NICOTINE POLACRILEX 2 MG: 2 LOZENGE ORAL at 04:32

## 2025-02-15 RX ADMIN — HYDROXYZINE HYDROCHLORIDE 50 MG: 50 TABLET, FILM COATED ORAL at 14:31

## 2025-02-15 RX ADMIN — NICOTINE POLACRILEX 2 MG: 2 LOZENGE ORAL at 09:25

## 2025-02-15 RX ADMIN — NICOTINE POLACRILEX 2 MG: 2 LOZENGE ORAL at 18:59

## 2025-02-15 RX ADMIN — IBUPROFEN 400 MG: 400 TABLET, FILM COATED ORAL at 08:21

## 2025-02-15 ASSESSMENT — PAIN SCALES - GENERAL
PAINLEVEL_OUTOF10: 0
PAINLEVEL_OUTOF10: 8
PAINLEVEL_OUTOF10: 0
PAINLEVEL_OUTOF10: 10
PAINLEVEL_OUTOF10: 7

## 2025-02-15 ASSESSMENT — LIFESTYLE VARIABLES
HOW OFTEN DO YOU HAVE A DRINK CONTAINING ALCOHOL: 4 OR MORE TIMES A WEEK
HOW MANY STANDARD DRINKS CONTAINING ALCOHOL DO YOU HAVE ON A TYPICAL DAY: 10 OR MORE

## 2025-02-15 ASSESSMENT — PAIN DESCRIPTION - DESCRIPTORS: DESCRIPTORS: ACHING

## 2025-02-15 ASSESSMENT — PAIN DESCRIPTION - LOCATION: LOCATION: FOOT

## 2025-02-15 NOTE — GROUP NOTE
Group Therapy Note    Date: 2/14/2025    Group Start Time: 2000  Group End Time: 2020  Group Topic: Relaxation    CZ Michaelle Angeles, RN      Group Therapy Note    Attendees: 5/11      Pt refused to attend Relaxation group this evening after encouragement from staff.  1:1 talk time offered as alternative to group session but pt declined. Will continue to encourage patient to attend unit group programming.        Signature:  Michaelle Banegas, RN

## 2025-02-15 NOTE — PLAN OF CARE
[facility]  Initial Interdisciplinary Treatment Plan Note      Original treatment plan Date & Time: 2/15/2025 1245    Admission Type:  Admission Type: Voluntary    Reason for admission:   Reason for Admission: auditory/visual hallucinations, depression with suicidal thoughts    Estimated Length of Stay:  5-7days  Estimated Discharge Date: To be determined by physician.    PATIENT STRENGTHS:  Patient Strengths:   Patient Strengths and Limitations:Limitations: Difficulty problem solving/relies on others to help solve problems, Inappropriate/potentially harmful leisure interests, Demonstrates discomfort with /lack of social skills  Addictive Behavior: Addictive Behavior  In the Past 3 Months, Have You Felt or Has Someone Told You That You Have a Problem With  : None  Medical Problems:  Past Medical History:   Diagnosis Date    Psychiatric problem      Status EXAM:Mental Status and Behavioral Exam  Normal: No  Level of Assistance: Independent/Self  Facial Expression: Flat  Affect: Blunt  Level of Consciousness: Alert  Frequency of Checks: 4 times per hour, close  Mood:Normal: No  Mood: Depressed, Anxious  Motor Activity:Normal: Yes  Eye Contact: Fair  Observed Behavior: Cooperative, Preoccupied  Sexual Misconduct History: Current - no  Preception: Union to person, Union to time, Union to place, Union to situation  Attention:Normal: Yes  Thought Processes: Unremarkable  Thought Content:Normal: Yes  Depression Symptoms: Feelings of helplessness, Feelings of hopelessess, Loss of interest  Anxiety Symptoms: Generalized  [place] Symptoms: No problems reported or observed.  Hallucinations: None  Delusions: No  Memory:Normal: Yes  Insight and Judgment: No  Insight and Judgment: Poor judgment, Poor insight    EDUCATION:   Learner Progress Toward Treatment Goals: Will review group plans and strategies for care.    Method: Group therapy, Medication compliance, Individualized assessments and Care

## 2025-02-15 NOTE — H&P
Department of Psychiatry  Attending Physician Psychiatric Assessment   Patient: Ambrocio Hill  MRN: [id number]  Reason for Admission to Psychiatric Unit:  Threat to self requiring 24 hour professional observation  A mental disorder causing major disability in social, interpersonal, occupational, and/or educational functioning that is leading to dangerous or life-threatening functioning, and that can only be addressed in an acute inpatient setting   Concerns about patient's safety in the community  Past Mental Health Diagnosis: a history of  Major Depression, Schizoaffective Disorder, Anxiety Disorder, Prior suicide attempt, and Alcohol and/or Drug Use Disorder  Triggering event or precipitating factor: Housing instability, Financial instability, Alcohol/Drug Relapse, and Relationship Issues  Length of time/duration of triggering event: Days  Legal Status: Voluntary    CHIEF COMPLAINT: Depression with suicidal ideation, psychosis    History obtained from: Patient, electronic medical record          HISTORY OF PRESENT ILLNESS:    Ambrocio Hill is a 24 y.o. male who has a past medical history of mental illness and polysubstance use. Patient presented to the ED from [facility] endorsing suicidal ideation. Stating \"I don't want to be alive, nothing is keeping me alive\". [facility] did not admit with expressed concerns of patient's medical condition of his feet. He is reported as wandering outside for the past 3 days without wear of shoes. He presented to the ED with callsuses on soles of feet with no evidence of frostbite or tissue damage.He reported homelessness and polysubstance use. He was at [facility] 4 months ago for drug services. Reports he is snorting his Seroquel to calm down. He endorses audiovisual hallucinations and history of schizoaffective disorder. He reports multiple psychiatric hospitalizations in [place] and [place]. This appears to be his first admission to [facility].     The patient is seen today for

## 2025-02-15 NOTE — GROUP NOTE
Group Therapy Note    Date: 2/15/2025    Group Start Time: 0903  Group End Time: 0921  Group Topic: Group Documentation    [facility] D    Mulu Rivera LPN        Group Therapy Note         Patient's Goal:  stay calm and collective     Status After Intervention:  Improved    Participation Level: Active Listener    Participation Quality: Appropriate      Speech:  normal      Thought Process/Content: Logical      Affective Functioning: Congruent      Mood:   cooperative       Level of consciousness:  Oriented x4      Response to Learning: Able to verbalize current knowledge/experience      Endings: None Reported    Modes of Intervention: Education      Discipline Responsible: Licensed Practical Nurse      Signature:  Mulu Rivera LPN

## 2025-02-15 NOTE — PLAN OF CARE
Problem: Pain  Goal: Verbalizes/displays adequate comfort level or baseline comfort level  Outcome: Progressing  Note: Pt denies having depression or anxiety. Pt denies having suicidal or homicidal ideations. Pt reports having adequate diet and sleep. Pt is seen pacing around the unit throughout the shift.     Problem: Psychosis  Goal: Will report no hallucinations or delusions  Description: INTERVENTIONS:  1. Administer medication as  ordered  2. Assist with reality testing to support increasing orientation  3. Assess if patient's hallucinations or delusions are encouraging self harm or harm to others and intervene as appropriate  Outcome: Progressing  Note: Pt denies having hallucinations. Pt has not been seen responding to internal stimulus.

## 2025-02-15 NOTE — H&P
[facility] Internal Medicine  Donavon Wilcox MD; Simone Rosales MD, Manuel Gonzalez MD, Radha Clayton MD, Dr Sally Chew MD; Gelacio De Guzman MD    [facility] Internal Medicine   IN-PATIENT SERVICE   [facility]     HISTORY AND PHYSICAL EXAMINATION            Date:   2/15/2025  Patient name:  Ambrocio Hill  Date of admission:  2/14/2025  5:47 PM  MRN:   [id number]  Account:  [id number]  YOB: 2000  PCP:    No primary care provider on file.  Room:   [address] [place] [address]  Code Status:    Full Code      Chief Complaint:     Suicidal /Ac Psychosis    History Obtained From:     Patient/EMR/bedside RN     History of Present Illness:     24-year-old male with a known history of anxiety, depression, admitted inpatient psych for suicidal ideations    Past Medical History:     Past Medical History:   Diagnosis Date    Psychiatric problem         Past Surgical History:     History reviewed. No pertinent surgical history.     Medications Prior to Admission:     Prior to Admission medications    Medication Sig Start Date End Date Taking? Authorizing Provider   ABILIFY MAINTENA 400 MG SRER 400 mg 11/7/24   ProviderOdell MD   lamoTRIgine (LAMICTAL) 25 MG tablet Take 1 tablet by mouth 11/11/24   Odell Herrera MD   prazosin (MINIPRESS) 1 MG capsule Take 1 capsule by mouth 11/11/24   Odell Herrera MD   QUEtiapine (SEROQUEL) 300 MG tablet Take 1 tablet by mouth 11/11/24   Odell Herrera MD   acetaminophen (TYLENOL) 500 MG tablet Take 1 tablet by mouth 4 times daily as needed for Pain 1/27/25 2/6/25  Ambrocio Byrnes DO   cetirizine (ZYRTEC) 10 MG tablet Take 1 tablet by mouth daily 1/27/25   Ambrocio Byrnes DO   ibuprofen (ADVIL;MOTRIN) 200 MG tablet Take 3 tablets by mouth every 8 hours as needed for Pain or Fever 1/27/25   Ambrocio Byrnes DO        Allergies:     Benadryl [diphenhydramine]    Social History:     Tobacco:    reports

## 2025-02-16 LAB
CHOLEST SERPL-MCNC: 198 MG/DL (ref 0–199)
CHOLESTEROL/HDL RATIO: 5.2
EST. AVERAGE GLUCOSE BLD GHB EST-MCNC: 111 MG/DL
HBA1C MFR BLD: 5.5 % (ref 4–6)
HDLC SERPL-MCNC: 38 MG/DL
LDLC SERPL CALC-MCNC: 133 MG/DL (ref 0–100)
TRIGL SERPL-MCNC: 136 MG/DL (ref 0–149)

## 2025-02-16 PROCEDURE — 83036 HEMOGLOBIN GLYCOSYLATED A1C: CPT

## 2025-02-16 PROCEDURE — 6370000000 HC RX 637 (ALT 250 FOR IP): Performed by: PSYCHIATRY & NEUROLOGY

## 2025-02-16 PROCEDURE — 99232 SBSQ HOSP IP/OBS MODERATE 35: CPT | Performed by: PSYCHIATRY & NEUROLOGY

## 2025-02-16 PROCEDURE — 1240000000 HC EMOTIONAL WELLNESS R&B

## 2025-02-16 PROCEDURE — 36415 COLL VENOUS BLD VENIPUNCTURE: CPT

## 2025-02-16 PROCEDURE — 80061 LIPID PANEL: CPT

## 2025-02-16 PROCEDURE — APPSS30 APP SPLIT SHARED TIME 16-30 MINUTES: Performed by: NURSE PRACTITIONER

## 2025-02-16 RX ORDER — ARIPIPRAZOLE 10 MG/1
10 TABLET ORAL DAILY
Status: DISCONTINUED | OUTPATIENT
Start: 2025-02-17 | End: 2025-02-19 | Stop reason: HOSPADM

## 2025-02-16 RX ADMIN — NICOTINE POLACRILEX 2 MG: 2 LOZENGE ORAL at 10:48

## 2025-02-16 RX ADMIN — LAMOTRIGINE 25 MG: 25 TABLET ORAL at 07:23

## 2025-02-16 RX ADMIN — NICOTINE POLACRILEX 2 MG: 2 LOZENGE ORAL at 06:24

## 2025-02-16 RX ADMIN — HALOPERIDOL 5 MG: 5 TABLET ORAL at 10:48

## 2025-02-16 RX ADMIN — LORAZEPAM 2 MG: 1 TABLET ORAL at 10:48

## 2025-02-16 RX ADMIN — NICOTINE POLACRILEX 2 MG: 2 LOZENGE ORAL at 08:51

## 2025-02-16 RX ADMIN — NICOTINE POLACRILEX 2 MG: 2 LOZENGE ORAL at 19:58

## 2025-02-16 RX ADMIN — IBUPROFEN 400 MG: 400 TABLET, FILM COATED ORAL at 05:56

## 2025-02-16 RX ADMIN — HYDROXYZINE HYDROCHLORIDE 50 MG: 50 TABLET, FILM COATED ORAL at 15:34

## 2025-02-16 RX ADMIN — NICOTINE POLACRILEX 2 MG: 2 LOZENGE ORAL at 13:02

## 2025-02-16 RX ADMIN — HYDROXYZINE HYDROCHLORIDE 50 MG: 50 TABLET, FILM COATED ORAL at 07:22

## 2025-02-16 RX ADMIN — NICOTINE POLACRILEX 2 MG: 2 LOZENGE ORAL at 07:44

## 2025-02-16 RX ADMIN — NICOTINE POLACRILEX 2 MG: 2 LOZENGE ORAL at 15:34

## 2025-02-16 RX ADMIN — ARIPIPRAZOLE 5 MG: 5 TABLET ORAL at 07:22

## 2025-02-16 RX ADMIN — NICOTINE POLACRILEX 2 MG: 2 LOZENGE ORAL at 18:05

## 2025-02-16 ASSESSMENT — LIFESTYLE VARIABLES
HOW OFTEN DO YOU HAVE A DRINK CONTAINING ALCOHOL: 4 OR MORE TIMES A WEEK
HOW MANY STANDARD DRINKS CONTAINING ALCOHOL DO YOU HAVE ON A TYPICAL DAY: 10 OR MORE
HOW OFTEN DO YOU HAVE A DRINK CONTAINING ALCOHOL: 4 OR MORE TIMES A WEEK
HOW MANY STANDARD DRINKS CONTAINING ALCOHOL DO YOU HAVE ON A TYPICAL DAY: 10 OR MORE

## 2025-02-16 ASSESSMENT — PAIN DESCRIPTION - LOCATION: LOCATION: FOOT

## 2025-02-16 ASSESSMENT — PAIN SCALES - GENERAL: PAINLEVEL_OUTOF10: 4

## 2025-02-16 ASSESSMENT — PAIN DESCRIPTION - ORIENTATION: ORIENTATION: RIGHT

## 2025-02-16 NOTE — PLAN OF CARE
Problem: Pain  Goal: Verbalizes/displays adequate comfort level or baseline comfort level  2/16/2025 0523 by Brittany Trevino LPN  Outcome: Progressing  2/15/2025 1724 by Essex, Matthew, RN  Outcome: Progressing     Problem: Psychosis  Goal: Will report no hallucinations or delusions  Description: INTERVENTIONS:  1. Administer medication as  ordered  2. Assist with reality testing to support increasing orientation  3. Assess if patient's hallucinations or delusions are encouraging self harm or harm to others and intervene as appropriate  2/16/2025 0523 by Brittany Trevino LPN  Outcome: Progressing     Problem: Behavior  Goal: Pt/Family maintain appropriate behavior and adhere to behavioral management agreement, if implemented  Description: INTERVENTIONS:  1. Assess patient/family's coping skills and  non-compliant behavior (including use of illegal substances)  2. Notify security of behavior or suspected illegal substances which indicate the need for search of the family and/or belongings  3. Encourage verbalization of thoughts and concerns in a socially appropriate manner  4. Utilize positive, consistent limit setting strategies supporting safety of patient, staff and others  5. Encourage participation in the decision making process about the behavioral management agreement  6. If a visitor's behavior poses a threat to safety call refer to organization policy.  7. Initiate consult with , Psychosocial CNS, Spiritual Care as appropriate  Outcome: Progressing     Note: Patient denies suicidal ideation, homicidal ideation and hallucinations at this time.

## 2025-02-16 NOTE — GROUP NOTE
Group Therapy Note    Date: 2/16/2025    Group Start Time: 0900  Group End Time: 0930  Group Topic: Group Documentation    [facility] Rebecca Diane LPN        Group Therapy Note    Attendees: 3/12       Patient's Goal:  inspire    Notes:  educate    Status After Intervention:  Improved    Participation Level: Minimal    Participation Quality: Attentive      Speech:  hesitant      Thought Process/Content: Logical      Affective Functioning: Flat      Mood: anxious      Level of consciousness:  Preoccupied      Response to Learning: Progressing to goal      Endings: None Reported    Modes of Intervention: Education      Discipline Responsible: Licensed Practical Nurse      Signature:  Rebecca العلي LPN

## 2025-02-16 NOTE — PLAN OF CARE
Problem: Pain  Goal: Verbalizes/displays adequate comfort level or baseline comfort level  2/16/2025 0911 by Christine Helms RN  Outcome: Progressing     Problem: Psychosis  Goal: Will report no hallucinations or delusions  Description: INTERVENTIONS:  1. Administer medication as  ordered  2. Assist with reality testing to support increasing orientation  3. Assess if patient's hallucinations or delusions are encouraging self harm or harm to others and intervene as appropriate  2/16/2025 0911 by Christine Helms RN  Outcome: Progressing     Problem: Behavior  Goal: Pt/Family maintain appropriate behavior and adhere to behavioral management agreement, if implemented  Description: INTERVENTIONS:  1. Assess patient/family's coping skills and  non-compliant behavior (including use of illegal substances)  2. Notify security of behavior or suspected illegal substances which indicate the need for search of the family and/or belongings  3. Encourage verbalization of thoughts and concerns in a socially appropriate manner  4. Utilize positive, consistent limit setting strategies supporting safety of patient, staff and others  5. Encourage participation in the decision making process about the behavioral management agreement  6. If a visitor's behavior poses a threat to safety call refer to organization policy.  7. Initiate consult with , Psychosocial CNS, Spiritual Care as appropriate  2/16/2025 0911 by Christine Helms RN  Outcome: Progressing     Patient is open to 1:1 talk time with staff. Patient reports the presence of severe anxiety today. Patient denies the presence of any depression, suicidal ideations, homicidal ideations, auditory hallucinations, and/or visual hallucinations. Patient is out and aloof of peers in the day area. Patient eats his meals and is compliant with his prescribed medications.

## 2025-02-16 NOTE — GROUP NOTE
Group Therapy Note    Date: 2/16/2025    Group Start Time: 1000  Group End Time: 1030  Group Topic: Psychoeducation    JESSICA BHLolis Mixon MSW, LORAINE        Group Therapy Note    Attendees: 6/12       Patient's Goal:  Expression of feeling     Notes:  Therapeutic conversation game    Status After Intervention:  Improved    Participation Level: Interactive    Participation Quality: Sharing      Speech:  normal      Thought Process/Content: Logical      Affective Functioning: Congruent      Mood: euthymic      Level of consciousness:  Alert and Oriented x4      Response to Learning: Progressing to goal      Endings: None Reported    Modes of Intervention: Education and Support      Discipline Responsible: /Counselor      Signature:  KASSIE Nelson, LORAINE

## 2025-02-17 PROCEDURE — 6370000000 HC RX 637 (ALT 250 FOR IP)

## 2025-02-17 PROCEDURE — 6370000000 HC RX 637 (ALT 250 FOR IP): Performed by: PSYCHIATRY & NEUROLOGY

## 2025-02-17 PROCEDURE — 99232 SBSQ HOSP IP/OBS MODERATE 35: CPT | Performed by: PSYCHIATRY & NEUROLOGY

## 2025-02-17 PROCEDURE — 1240000000 HC EMOTIONAL WELLNESS R&B

## 2025-02-17 PROCEDURE — 99232 SBSQ HOSP IP/OBS MODERATE 35: CPT

## 2025-02-17 RX ORDER — AMLODIPINE BESYLATE 5 MG/1
2.5 TABLET ORAL DAILY
Status: DISCONTINUED | OUTPATIENT
Start: 2025-02-17 | End: 2025-02-19 | Stop reason: HOSPADM

## 2025-02-17 RX ADMIN — HYDROXYZINE HYDROCHLORIDE 50 MG: 50 TABLET, FILM COATED ORAL at 14:39

## 2025-02-17 RX ADMIN — AMLODIPINE BESYLATE 2.5 MG: 5 TABLET ORAL at 14:13

## 2025-02-17 RX ADMIN — HYDROXYZINE HYDROCHLORIDE 50 MG: 50 TABLET, FILM COATED ORAL at 20:36

## 2025-02-17 RX ADMIN — LAMOTRIGINE 25 MG: 25 TABLET ORAL at 08:37

## 2025-02-17 RX ADMIN — TRAZODONE HYDROCHLORIDE 50 MG: 50 TABLET ORAL at 20:36

## 2025-02-17 RX ADMIN — ARIPIPRAZOLE 10 MG: 10 TABLET ORAL at 08:38

## 2025-02-17 RX ADMIN — NICOTINE POLACRILEX 2 MG: 2 LOZENGE ORAL at 14:39

## 2025-02-17 RX ADMIN — NICOTINE POLACRILEX 2 MG: 2 LOZENGE ORAL at 12:57

## 2025-02-17 RX ADMIN — IBUPROFEN 400 MG: 400 TABLET, FILM COATED ORAL at 20:36

## 2025-02-17 RX ADMIN — NICOTINE POLACRILEX 2 MG: 2 LOZENGE ORAL at 08:37

## 2025-02-17 RX ADMIN — NICOTINE POLACRILEX 2 MG: 2 LOZENGE ORAL at 06:31

## 2025-02-17 RX ADMIN — HYDROXYZINE HYDROCHLORIDE 50 MG: 50 TABLET, FILM COATED ORAL at 08:37

## 2025-02-17 RX ADMIN — NICOTINE POLACRILEX 2 MG: 2 LOZENGE ORAL at 18:00

## 2025-02-17 RX ADMIN — NICOTINE POLACRILEX 2 MG: 2 LOZENGE ORAL at 19:54

## 2025-02-17 ASSESSMENT — PAIN SCALES - GENERAL
PAINLEVEL_OUTOF10: 10
PAINLEVEL_OUTOF10: 0

## 2025-02-17 ASSESSMENT — PAIN - FUNCTIONAL ASSESSMENT: PAIN_FUNCTIONAL_ASSESSMENT: ACTIVITIES ARE NOT PREVENTED

## 2025-02-17 ASSESSMENT — PAIN DESCRIPTION - DESCRIPTORS: DESCRIPTORS: DISCOMFORT

## 2025-02-17 ASSESSMENT — PAIN DESCRIPTION - LOCATION: LOCATION: FOOT

## 2025-02-17 ASSESSMENT — PAIN DESCRIPTION - ORIENTATION: ORIENTATION: RIGHT

## 2025-02-17 NOTE — PLAN OF CARE
Problem: Pain  Goal: Verbalizes/displays adequate comfort level or baseline comfort level  Outcome: Progressing     Problem: Psychosis  Goal: Will report no hallucinations or delusions  Description: INTERVENTIONS:  1. Administer medication as  ordered  2. Assist with reality testing to support increasing orientation  3. Assess if patient's hallucinations or delusions are encouraging self harm or harm to others and intervene as appropriate  Outcome: Progressing     Problem: Behavior  Goal: Pt/Family maintain appropriate behavior and adhere to behavioral management agreement, if implemented  Description: INTERVENTIONS:  1. Assess patient/family's coping skills and  non-compliant behavior (including use of illegal substances)  2. Notify security of behavior or suspected illegal substances which indicate the need for search of the family and/or belongings  3. Encourage verbalization of thoughts and concerns in a socially appropriate manner  4. Utilize positive, consistent limit setting strategies supporting safety of patient, staff and others  5. Encourage participation in the decision making process about the behavioral management agreement  6. If a visitor's behavior poses a threat to safety call refer to organization policy.  7. Initiate consult with , Psychosocial CNS, Spiritual Care as appropriate  Outcome: Progressing     Note: Patient denies suicidal ideation, homicidal ideation and hallucinations at this time.

## 2025-02-17 NOTE — GROUP NOTE
Group Therapy Note    Date: 2/17/2025    Group Start Time: 1100  Group End Time: 1145  Group Topic: Psychoeducation    [facility]RI[facility]    Lakeisha Sanches CTRS    Group Therapy Note    Attendees: 8/16     Patient's Goal:  Patient will identify benefits of leisure for coping and stress management    Notes:  Patient attended group and participated.    Status After Intervention:  Improved    Participation Level: Active Listener and Interactive    Participation Quality: Appropriate, Attentive, Sharing, and Supportive      Speech:  normal      Thought Process/Content: Logical  Linear      Affective Functioning: Constricted/Restricted      Mood: euthymic      Level of consciousness:  Alert, Oriented x4, and Attentive      Response to Learning: Able to verbalize current knowledge/experience, Able to verbalize/acknowledge new learning, Able to retain information, Able to change behavior, and Progressing to goal      Endings: None Reported    Modes of Intervention: Education, Support, Socialization, and Exploration      Discipline Responsible: Psychoeducational Specialist      Signature:  VINOD Campos

## 2025-02-17 NOTE — PLAN OF CARE
Problem: Pain  Goal: Verbalizes/displays adequate comfort level or baseline comfort level  Outcome: Progressing  Note: Pt denies having depression or anxiety. Pt denies having suicidal or homicidal ideations. Pt has been pleasant and cooperative with staff. Pt is out and social with select peers. Pt has been cooperative with medical treatment.     Problem: Psychosis  Goal: Will report no hallucinations or delusions  Description: INTERVENTIONS:  1. Administer medication as  ordered  2. Assist with reality testing to support increasing orientation  3. Assess if patient's hallucinations or delusions are encouraging self harm or harm to others and intervene as appropriate  Outcome: Progressing  Note: Pt denies having hallucinations and has not been seen responding to internal stimulus.

## 2025-02-17 NOTE — GROUP NOTE
Group Therapy Note    Date: 2/17/2025    Group Start Time: 1430  Group End Time: 1515  Group Topic: Psychoeducation    [facility] Lakeisha Reed CTRS    Group Therapy Note    Attendees: 6/15     Patient's Goal:  Patient will identify benefits of leisure for coping and stress management    Notes:  Patient attended group and participated    Status After Intervention:  Improved    Participation Level: Active Listener and Interactive    Participation Quality: Appropriate, Attentive, Supportive and Sharing      Speech:  normal      Thought Process/Content: Logical  Linear      Affective Functioning: Constricted/Restricted      Mood: Euthymic      Level of consciousness:  Alert, Oriented x4, and Attentive      Response to Learning: Able to verbalize current knowledge/experience, Able to verbalize/acknowledge new learning, Able to change behavior, and Progressing to goal      Endings: None Reported    Modes of Intervention: Education, Support, Socialization, and Exploration      Discipline Responsible: Psychoeducational Specialist      Signature:  VINOD Campos

## 2025-02-17 NOTE — GROUP NOTE
Group Therapy Note    Date: 2/17/2025    Group Start Time: 0900  Group End Time: 0930  Group Topic: Orientation Group    JESSICA RIOSI Ilene Maldonado LPN        Group Therapy Note    Attendees: 6/16       Patient's Goal:  Orientation/ Goal setting group    Notes:   Patient able to verbalize the understanding of setting goals    Status After Intervention:  Improved    Participation Level: Active Listener    Participation Quality: Attentive and Sharing      Speech:  normal      Thought Process/Content: Logical      Affective Functioning: Congruent      Mood: anxious      Level of consciousness:  Alert and Attentive      Response to Learning: Able to verbalize current knowledge/experience, Able to verbalize/acknowledge new learning, and Able to retain information      Endings: None Reported    Modes of Intervention: Education, Support, and Socialization      Discipline Responsible: Licensed Practical Nurse      Signature:  Ilene Rodríguez LPN

## 2025-02-17 NOTE — PLAN OF CARE
[facility]  Day 3 Interdisciplinary Treatment Plan NOTE    Review Date & Time: 2/17/2025 1245    Admission Type:   Admission Type: Voluntary    Reason for admission:  Reason for Admission: auditory/visual hallucinations, depression with suicidal thoughts  Estimated Length of Stay: 5-7 days  Estimated Discharge Date Update: to be determined by physician    PATIENT STRENGTHS:  Patient Strengths    Patient Strengths and Limitations:Limitations: Difficulty problem solving/relies on others to help solve problems, Inappropriate/potentially harmful leisure interests, Demonstrates discomfort with /lack of social skills  Addictive Behavior:Addictive Behavior  In the Past 3 Months, Have You Felt or Has Someone Told You That You Have a Problem With  : None  Medical Problems:  Past Medical History:   Diagnosis Date    Psychiatric problem        Risk:  Fall Risk   Lalit Scale Lalit Scale Score: 22  BVC    Change in scores no Changes to plan of Care no    Status EXAM:   Mental Status and Behavioral Exam  Normal: No  Level of Assistance: Independent/Self  Facial Expression: Flat  Affect: Blunt  Level of Consciousness: Alert  Frequency of Checks: 4 times per hour, close  Mood:Normal: No  Mood: Anxious  Motor Activity:Normal: Yes  Eye Contact: Fair  Observed Behavior: Cooperative  Sexual Misconduct History: Current - no  Preception: Pax to person, Pax to time, Pax to place, Pax to situation  Attention:Normal: Yes  Thought Processes: Unremarkable  Thought Content:Normal: Yes  Depression Symptoms: No problems reported or observed.  Anxiety Symptoms: Generalized  Lee Ann Symptoms: No problems reported or observed.  Hallucinations: None  Delusions: No  Memory:Normal: Yes  Insight and Judgment: No  Insight and Judgment: Poor judgment    Daily Assessment Last Entry:   Daily Sleep (WDL): Within Defined Limits            Daily Nutrition (WDL): Within Defined Limits  Level of Assistance:

## 2025-02-17 NOTE — GROUP NOTE
Group Therapy Note    Date: 2/17/2025    Group Start Time: 1000  Group End Time: 1045  Group Topic: Psychoeducation    [facility] Lakeisha Reed CTRS    Group Therapy Note    Attendees: 7/17     Patient's Goal:  Patient will demonstrate improved interpersonal skills and offer peer support    Notes:  Patient attended group and participated.      Status After Intervention:  Improved    Participation Level: Active Listener and Interactive    Participation Quality: Appropriate, Sharing, and Supportive      Speech:  normal      Thought Process/Content: Logical and Linear      Affective Functioning: Constricted/Restricted      Mood: Euthymic      Level of consciousness:  Alert, Orientedx4 and Oriented x4      Response to Learning: Able to verbalize current knowledge/experience, Able to verbalize/acknowledge new learning, Able to retain information, Able to change behavior, and Progressing to goal      Endings: None Reported    Modes of Intervention: Education, Support, Socialization, and Exploration      Discipline Responsible: Psychoeducational Specialist      Signature:  VINOD Campos

## 2025-02-18 PROCEDURE — 6370000000 HC RX 637 (ALT 250 FOR IP)

## 2025-02-18 PROCEDURE — 6370000000 HC RX 637 (ALT 250 FOR IP): Performed by: PSYCHIATRY & NEUROLOGY

## 2025-02-18 PROCEDURE — APPSS30 APP SPLIT SHARED TIME 16-30 MINUTES

## 2025-02-18 PROCEDURE — 1240000000 HC EMOTIONAL WELLNESS R&B

## 2025-02-18 PROCEDURE — 99232 SBSQ HOSP IP/OBS MODERATE 35: CPT

## 2025-02-18 PROCEDURE — GZ56ZZZ INDIVIDUAL PSYCHOTHERAPY, SUPPORTIVE: ICD-10-PCS | Performed by: PSYCHIATRY & NEUROLOGY

## 2025-02-18 RX ORDER — HYDROXYZINE HYDROCHLORIDE 50 MG/1
50 TABLET, FILM COATED ORAL 3 TIMES DAILY PRN
Qty: 30 TABLET | Refills: 0 | Status: SHIPPED | OUTPATIENT
Start: 2025-02-18 | End: 2025-02-28

## 2025-02-18 RX ORDER — AMLODIPINE BESYLATE 2.5 MG/1
2.5 TABLET ORAL DAILY
Qty: 30 TABLET | Refills: 3 | Status: SHIPPED | OUTPATIENT
Start: 2025-02-19

## 2025-02-18 RX ORDER — POLYETHYLENE GLYCOL 3350 17 G
2 POWDER IN PACKET (EA) ORAL
Qty: 100 EACH | Refills: 3 | Status: SHIPPED | OUTPATIENT
Start: 2025-02-18

## 2025-02-18 RX ORDER — LAMOTRIGINE 25 MG/1
25 TABLET ORAL DAILY
Qty: 30 TABLET | Refills: 0 | Status: SHIPPED | OUTPATIENT
Start: 2025-02-19

## 2025-02-18 RX ORDER — TRAZODONE HYDROCHLORIDE 50 MG/1
50 TABLET ORAL NIGHTLY PRN
Qty: 30 TABLET | Refills: 0 | Status: SHIPPED | OUTPATIENT
Start: 2025-02-18

## 2025-02-18 RX ADMIN — HYDROXYZINE HYDROCHLORIDE 50 MG: 50 TABLET, FILM COATED ORAL at 09:07

## 2025-02-18 RX ADMIN — NICOTINE POLACRILEX 2 MG: 2 LOZENGE ORAL at 11:50

## 2025-02-18 RX ADMIN — ARIPIPRAZOLE 10 MG: 10 TABLET ORAL at 09:06

## 2025-02-18 RX ADMIN — HYDROXYZINE HYDROCHLORIDE 50 MG: 50 TABLET, FILM COATED ORAL at 16:52

## 2025-02-18 RX ADMIN — NICOTINE POLACRILEX 2 MG: 2 LOZENGE ORAL at 06:05

## 2025-02-18 RX ADMIN — NICOTINE POLACRILEX 2 MG: 2 LOZENGE ORAL at 07:18

## 2025-02-18 RX ADMIN — LAMOTRIGINE 25 MG: 25 TABLET ORAL at 09:07

## 2025-02-18 RX ADMIN — NICOTINE POLACRILEX 2 MG: 2 LOZENGE ORAL at 16:52

## 2025-02-18 RX ADMIN — NICOTINE POLACRILEX 2 MG: 2 LOZENGE ORAL at 09:07

## 2025-02-18 RX ADMIN — AMLODIPINE BESYLATE 2.5 MG: 5 TABLET ORAL at 09:07

## 2025-02-18 NOTE — PLAN OF CARE
Problem: Pain  Goal: Verbalizes/displays adequate comfort level or baseline comfort level  Outcome: Progressing     Problem: Psychosis  Goal: Will report no hallucinations or delusions  Description: INTERVENTIONS:  1. Administer medication as  ordered  2. Assist with reality testing to support increasing orientation  3. Assess if patient's hallucinations or delusions are encouraging self harm or harm to others and intervene as appropriate  Outcome: Progressing     Problem: Behavior  Goal: Pt/Family maintain appropriate behavior and adhere to behavioral management agreement, if implemented  Description: INTERVENTIONS:  1. Assess patient/family's coping skills and  non-compliant behavior (including use of illegal substances)  2. Notify security of behavior or suspected illegal substances which indicate the need for search of the family and/or belongings  3. Encourage verbalization of thoughts and concerns in a socially appropriate manner  4. Utilize positive, consistent limit setting strategies supporting safety of patient, staff and others  5. Encourage participation in the decision making process about the behavioral management agreement  6. If a visitor's behavior poses a threat to safety call refer to organization policy.  7. Initiate consult with , Psychosocial CNS, Spiritual Care as appropriate  Outcome: Progressing

## 2025-02-18 NOTE — GROUP NOTE
Group Therapy Note    Date: 2/18/2025    Group Start Time: 1000  Group End Time: 1045  Group Topic: Psychoeducation    [facility] BHFarrah Tavera LSW        Group Therapy Note    Attendees: 6/16       Patient's Goal:  mindful coping     Notes:      Status After Intervention:  Improved    Participation Level: Active Listener and Interactive    Participation Quality: Appropriate and Attentive      Speech:  normal      Thought Process/Content: Logical      Affective Functioning: Congruent      Mood: euthymic      Level of consciousness:  Alert and Oriented x4      Response to Learning: Able to verbalize current knowledge/experience and Able to verbalize/acknowledge new learning      Endings: None Reported    Modes of Intervention: Education and Support      Discipline Responsible: /Counselor      Signature:  LORAINE Adan

## 2025-02-18 NOTE — GROUP NOTE
Group Therapy Note    Date: 2/17/2025    Group Start Time: 2000  Group End Time: 2020  Group Topic: Insomnia Group    [facility] BHThaddeus Hinojosa, RN        Group Therapy Note    Attendees: 5/16       Patient's Goal:  Improved Sleep    Notes:       Status After Intervention:  Improved    Participation Level: Active Listener and Interactive    Participation Quality: Appropriate and Attentive      Speech:  normal      Thought Process/Content: Logical  Linear      Affective Functioning: Congruent      Mood: euthymic      Level of consciousness:  Alert and Oriented x4      Response to Learning: Able to verbalize current knowledge/experience and Able to verbalize/acknowledge new learning      Endings: None Reported    Modes of Intervention: Education and Clarifying      Discipline Responsible: Registered Nurse      Signature:  Thaddeus Morales RN

## 2025-02-18 NOTE — PLAN OF CARE
Problem: Psychosis  Goal: Will report no hallucinations or delusions  Description: INTERVENTIONS:  1. Administer medication as  ordered  2. Assist with reality testing to support increasing orientation  3. Assess if patient's hallucinations or delusions are encouraging self harm or harm to others and intervene as appropriate  2/17/2025 2220 by Farrah Dill RN  Outcome: Progressing     Problem: Behavior  Goal: Pt/Family maintain appropriate behavior and adhere to behavioral management agreement, if implemented  Description: INTERVENTIONS:  1. Assess patient/family's coping skills and  non-compliant behavior (including use of illegal substances)  2. Notify security of behavior or suspected illegal substances which indicate the need for search of the family and/or belongings  3. Encourage verbalization of thoughts and concerns in a socially appropriate manner  4. Utilize positive, consistent limit setting strategies supporting safety of patient, staff and others  5. Encourage participation in the decision making process about the behavioral management agreement  6. If a visitor's behavior poses a threat to safety call refer to organization policy.  7. Initiate consult with , Psychosocial CNS, Spiritual Care as appropriate  Outcome: Progressing     Patient alert and oriented. Patient denies suicidal and homicidal ideation at this time.  Patient endorses some symptoms of anxiety and depression but states they are improving at this time.  Patient is out and social with select peers in the milieu this evening. Patient is friendly and cooperative with staff. Patient is medication compliant and behavior remains controlled at this time.  Safe environment provided. Q15 minute checks maintained.

## 2025-02-18 NOTE — GROUP NOTE
Group Therapy Note    Date: 2/18/2025    Group Start Time: 1100  Group End Time: 1145  Group Topic: Psychoeducation    [facility]    Lakeisha Sanches CTRS    Group Therapy Note    Attendees: 7/16     Patient's Goal:  Patient will identify benefits of leisure for coping and stress management.  Patient will demonstrate improved communication by participating in team activity.      Notes:  Patient attended group and participated    Status After Intervention:  Improved    Participation Level: Active Listener and Interactive    Participation Quality: Appropriate, Attentive, Sharing, and Supportive      Speech:  normal      Thought Process/Content: Logical  Linear      Affective Functioning: Constricted/Restricted      Mood: euthymic      Level of consciousness:  Alert, Oriented x4, and Attentive      Response to Learning: Able to verbalize current knowledge/experience, Able to verbalize/acknowledge new learning, Able to retain information, Able to change behavior, and Progressing to goal      Endings: None Reported    Modes of Intervention: Education, Support, and Socialization      Discipline Responsible: Psychoeducational Specialist      Signature:  VINOD Campos

## 2025-02-18 NOTE — GROUP NOTE
Group Therapy Note    Date: 2/18/2025    Group Start Time: 0927  Group End Time: 0942  Group Topic: Group Documentation    [facility]    Mulu Rivera LPN    Group Therapy Note         Patient's Goal:  quit smoking       Status After Intervention:  Improved    Participation Level: Active Listener    Participation Quality: Appropriate      Speech:  normal      Thought Process/Content: Logical      Affective Functioning: Congruent      Mood:  cooperative       Level of consciousness:  Oriented x4      Response to Learning: Able to verbalize current knowledge/experience      Endings: None Reported    Modes of Intervention: Education      Discipline Responsible: Licensed Practical Nurse      Signature:  Mulu Rivera LPN

## 2025-02-18 NOTE — GROUP NOTE
Psych-Ed/Relapse Prevention Group Note        Date: February 18, 2025 Start Time: 1:30pm  End Time:  2:15pm      Number of Participants in Group & Unit Census:  8/16    Topic: Coping skills and Relaxation    Goal of Group:Patient will identify benefits of creative expression for coping and stress management      Comments:     Patient did not participate in Psych-Ed/Relapse Prevention group, despite staff encouragement and explanation of benefits.  Patient remain seclusive to self.  Q15 minute safety checks maintained for patient safety and will continue to encourage patient to attend unit programming.         Signature:  DANYEL CamposS

## 2025-02-18 NOTE — PROGRESS NOTES
Behavioral Services  Medicare Certification Upon Admission    I certify that this patient's inpatient psychiatric hospital admission is medically necessary for:    [x] (1) Treatment which could reasonably be expected to improve this patient's condition,       [x] (2) Or for diagnostic study;     AND     [x](2) The inpatient psychiatric services are provided while the individual is under the care of a physician and are included in the individualized plan of care.    Estimated length of stay/service 5    Plan for post-hospital care home    Electronically signed by Charan Mistry MD on 2/16/2025 at 6:15 PM      
    [facility] Internal Medicine  Donavon Wilcox MD; Simone Rosales MD, Manuel Gonzalez MD, Radha Clayton MD, Dr Sally Chew MD; Gelacio De Guzman MD    [facility] Internal Medicine   IN-PATIENT SERVICE   [facility]    Progress note            Date:   2/18/2025  Patient name:  Ambrocio Hill  Date of admission:  2/14/2025  5:47 PM  MRN:   [id number]  Account:  [id number]  YOB: 2000  PCP:    No primary care provider on file.  Room:   [address] [place] [address]  Code Status:    Full Code      Chief Complaint:     Suicidal /Ac Psychosis    History Obtained From:     Patient/EMR/bedside RN     History of Present Illness:     24-year-old male with a known history of anxiety, depression, admitted inpatient psych for suicidal ideations    Past Medical History:     Past Medical History:   Diagnosis Date    Psychiatric problem         Past Surgical History:     History reviewed. No pertinent surgical history.     Medications Prior to Admission:     Prior to Admission medications    Medication Sig Start Date End Date Taking? Authorizing Provider   ARIPiprazole ER (ABILIFY MAINTENA) 400 MG SRER Inject 400 mg into the muscle every 28 days 3/17/25  Yes Nabeel Muller MD   amLODIPine (NORVASC) 2.5 MG tablet Take 1 tablet by mouth daily 2/19/25  Yes Nabeel Muller MD   hydrOXYzine HCl (ATARAX) 50 MG tablet Take 1 tablet by mouth 3 times daily as needed for Anxiety 2/18/25 2/28/25 Yes Nabeel Muller MD   lamoTRIgine (LAMICTAL) 25 MG tablet Take 1 tablet by mouth daily 2/19/25  Yes Nabeel Muller MD   nicotine polacrilex (COMMIT) 2 MG lozenge Take 1 lozenge by mouth every hour as needed for Smoking cessation 2/18/25  Yes Nabeel Muller MD   traZODone (DESYREL) 50 MG tablet Take 1 tablet by mouth nightly as needed for Sleep 2/18/25  Yes Nabeel Muller MD   acetaminophen (TYLENOL) 500 MG tablet Take 1 tablet by mouth 4 times daily as needed for Pain 1/27/25 2/6/25  
    [facility] Internal Medicine  Donavon Wilcox MD; Simone Rosales MD, Manuel Gonzalez MD, Radha Clayton MD, Dr Sally Chew MD; Gelacio De Guzman MD    [facility] Internal Medicine   IN-PATIENT SERVICE   [facility]    Progress note            Date:   2/17/2025  Patient name:  Ambrocio Hill  Date of admission:  2/14/2025  5:47 PM  MRN:   [id number]  Account:  [id number]  YOB: 2000  PCP:    No primary care provider on file.  Room:   [address] [place] [address]  Code Status:    Full Code      Chief Complaint:     Suicidal /Ac Psychosis    History Obtained From:     Patient/EMR/bedside RN     History of Present Illness:     24-year-old male with a known history of anxiety, depression, admitted inpatient psych for suicidal ideations    Past Medical History:     Past Medical History:   Diagnosis Date    Psychiatric problem         Past Surgical History:     History reviewed. No pertinent surgical history.     Medications Prior to Admission:     Prior to Admission medications    Medication Sig Start Date End Date Taking? Authorizing Provider   ABILIFY MAINTENA 400 MG SRER 400 mg 11/7/24   Odell Herrera MD   lamoTRIgine (LAMICTAL) 25 MG tablet Take 1 tablet by mouth 11/11/24   Odell Herrera MD   prazosin (MINIPRESS) 1 MG capsule Take 1 capsule by mouth 11/11/24   Odell Herrera MD   QUEtiapine (SEROQUEL) 300 MG tablet Take 1 tablet by mouth 11/11/24   Odell Herrera MD   acetaminophen (TYLENOL) 500 MG tablet Take 1 tablet by mouth 4 times daily as needed for Pain 1/27/25 2/6/25  Ambrocio Byrnes DO   cetirizine (ZYRTEC) 10 MG tablet Take 1 tablet by mouth daily 1/27/25   Ambrocio Byrnes DO   ibuprofen (ADVIL;MOTRIN) 200 MG tablet Take 3 tablets by mouth every 8 hours as needed for Pain or Fever 1/27/25   Ambrocio Byrnes DO        Allergies:     Benadryl [diphenhydramine]    Social History:     Tobacco:    reports that he has been 
  Daily Progress Note  2/16/2025    Patient Name: Ambrocio Hill    CHIEF COMPLAINT: Depression with suicidal ideation and acute psychosis         SUBJECTIVE:    Patient seen face-to-face for follow-up assessment.  He is initially observed pacing in the day area and presents is on edge.  He states his anxiety is elevated.  Feels uneasy.  Has difficulty engaging in constructive conversation as he does appear to be preoccupied with his thoughts.  Patient was started on low-dose lamotrigine 25 mg and aripiprazole 5 mg yesterday, as he noted these medications were helpful in the past.  We reviewed for any side effects and patient denies all.  While he is restless, he does not appear to be exhibiting any akathisia.  Will continue to monitor staff report that patient did require emergency oral medications today secondary to the distress he was experiencing due to anxiety.  He has been able to maintain behavioral control.  Has not been aggressive or hospital.  Staff note that he has been attending group programming with appropriate participation.  He reports fair sleep at night.  Denies any problems with appetite.  Still having fleeting suicidal thoughts and unable to contract for safety off unit.    Appetite:  [x] Adequate/Unchanged  [] Increased  [] Decreased      Sleep:       [] Adequate/Unchanged  [x] Fair  [] Poor      Group Attendance on Unit:   [x] Yes   [] Selectively    [] No    Compliant with scheduled medications: [x] Yes  [] No    Received emergency medications in past 24 hrs: [] Yes   [x] No    Medication Side Effects: Denies         Mental Status Exam  Level of consciousness: Alert and awake   Appearance: Appropriate attire for setting, standing, with fair  grooming and hygiene   Behavior/Motor: Approachable, engages with interviewer, pacing  Attitude toward examiner: Cooperative, easily distracted, fair eye contact  Speech: spontaneous, normal rate, normal volume, and well articulated   Mood: Patient reports 
  Daily Progress Note  2/18/2025    Patient Name: Ambrocio Hill    CHIEF COMPLAINT: Schizoaffective disorder, bipolar type         SUBJECTIVE:    Patient is seen today for a follow up assessment.  He is found in the day room.  Agrees to conduct interview in sensory room.  Describes mood as \"good\".  He endorses lift of depressive mood.  Denies anxiety.  Denies suicidal or homicidal ideation.  Denies experiencing any perceptual disturbances.  He is actively attending groups and remains socially appropriate on the unit.  He received Abilify Maintena yesterday evening.  He is compliant with taking oral psychotropic medications.  No adverse effects reported.  He has been without behavioral disturbances or use of emergency medications the past 24 hours.  He is planned for discharge tomorrow.    Appetite:  [x] Adequate/Unchanged  [] Increased  [] Decreased      Sleep:       [x] Adequate/Unchanged  [] Fair  [] Poor      Group Attendance on Unit:   [x] Yes   [] Selectively    [] No    Compliant with scheduled medications: [x] Yes  [] No    Received emergency medications in past 24 hrs: [] Yes   [x] No    Medication Side Effects: Denies         Mental Status Exam  Level of consciousness: Alert and awake   Appearance: Appropriate attire for setting, seated in chair, with fair  grooming and hygiene   Behavior/Motor: Approachable, engages with interviewer, no psychomotor abnormalities   Attitude toward examiner: Cooperative, attentive, good eye contact  Speech: spontaneous, normal rate, and normal volume   Mood: \"Better\"  Affect: Mood congruent  Thought processes: linear, goal directed, and coherent   Thought content:  Denies homicidal ideation  Suicidal Ideation: Denies suicidal ideations, contracts for safety on the unit.   Delusions: No evidence of delusions.   Perceptual Disturbance: Denies.  Patient does not appear to be responding to internal stimuli.   Cognition: Oriented to self, location, time, and situation  Memory: 
CLINICAL PHARMACY NOTE: MEDS TO BEDS    Total # of Prescriptions Filled: 5   The following medications were delivered to the patient:  Lamotrigine 25MG Tablets   Amlodipine Besylate 2.5MG Tablets   Nicotine 2MG Lozenges   Hydroxyzine HCL 50MG Tablets   Trazodone HCL 50MG Tablets     Additional Documentation:  Delivered to [facility] and signed for by Wilfrido at 3:27PM 2/18/25  
ID: [id number]   
Patient given tobacco quitline number 52363923497 at this time, refusing to call at this time, states \" I just dont want to quit now\"- patient given information as to the dangers of long term tobacco use. Continue to reinforce the importance of tobacco cessation.    
RT ASSESSMENT TREATMENT GOALS    [x]Pt Goal:  Pt will identify 1-2 positive coping skills by time of discharge.    []Pt Goal:  Pt will identify 1-2 positive aspects of self by time of discharge.    []Pt Goal:  Pt will remain on task/topic for 15-30 minutes during group by time of discharge.    [x]Pt Goal:  Pt will identify 1-2 aspects of relapse prevention plan by time of discharge.    [x]Pt Goal:  Pt will join in conversation with peers 1-2 times per group by time of discharge.    []Pt Goal:  Pt will identify 1-2 new leisure interests by time of discharge.    []Pt Goal:  Pt will not voice any delusional content by time of discharge.    
counseling  ( ) Patient has not smoked in the last 30 days    Metabolic Screening:    No results found for: \"LABA1C\"    No results found for: \"CHOL\"  No results found for: \"TRIG\"  No results found for: \"HDL\"  No components found for: \"LDLCAL\"  No components found for: \"LABVLDL\"      Body mass index is 34.37 kg/m².    BP Readings from Last 2 Encounters:   02/14/25 128/88   01/27/25 (!) 147/96           Pt admitted with followings belongings:  Dental Appliances: None  Vision - Corrective Lenses: None  Hearing Aid: None  Jewelry: None  Body Piercings Removed: N/A  Clothing: Pants, Shirt, Socks, Undergarments, Other (Comment) (see written)  Other Valuables: Other (Comment) (see written)  The following personal items were collected during admission. Items secured in locker/safe. Items will be returned to patient at discharge.   presented to ED with depression with suicidal ideations. Reported auditory/visual hallucinations. Thoughts reality based. Cooperative with admit process.     John Wolf, RN         
Cholesterol, Total 02/16/2025 198  0 - 199 mg/dL Final    Comment:    Cholesterol Guidelines:      <200  Desirable   200-240  Borderline      >240  Undesirable         HDL 02/16/2025 38 (L)  >40 mg/dL Final    Comment:    HDL Guidelines:    <40     Undesirable   40-59    Borderline    >59     Desirable         LDL Cholesterol 02/16/2025 133 (H)  0 - 100 mg/dL Final    Comment:    LDL Guidelines:     <100    Desirable   100-129   Near to/above Desirable   130-159   Borderline      >159   Undesirable     Direct (measured) LDL and calculated LDL are not interchangeable tests.      Chol/HDL Ratio 02/16/2025 5.2   Final    Triglycerides 02/16/2025 136  0 - 149 mg/dL Final    Comment:    Triglyceride Guidelines:     <150   Desirable   150-199  Borderline   200-499  High     >499   Very high   Based on AHA Guidelines for fasting triglyceride, October 2012.              Reviewed patient's current plan of care and vital signs with nursing staff.    Labs reviewed: [x] Yes  Vitals reviewed: [x] Yes      Medications  Current Facility-Administered Medications: amLODIPine (NORVASC) tablet 2.5 mg, 2.5 mg, Oral, Daily  ARIPiprazole ER (ABILIFY MAINTENA) injection 400 mg, 400 mg, IntraMUSCular, Q28 Days  ARIPiprazole (ABILIFY) tablet 10 mg, 10 mg, Oral, Daily  nicotine polacrilex (COMMIT) lozenge 2 mg, 2 mg, Oral, Q1H PRN  lamoTRIgine (LAMICTAL) tablet 25 mg, 25 mg, Oral, Daily  haloperidol (HALDOL) tablet 5 mg, 5 mg, Oral, Q6H PRN **AND** LORazepam (ATIVAN) tablet 2 mg, 2 mg, Oral, Q6H PRN  acetaminophen (TYLENOL) tablet 650 mg, 650 mg, Oral, Q6H PRN  ibuprofen (ADVIL;MOTRIN) tablet 400 mg, 400 mg, Oral, Q6H PRN  hydrOXYzine HCl (ATARAX) tablet 50 mg, 50 mg, Oral, TID PRN  traZODone (DESYREL) tablet 50 mg, 50 mg, Oral, Nightly PRN  polyethylene glycol (GLYCOLAX) packet 17 g, 17 g, Oral, Daily PRN  aluminum & magnesium hydroxide-simethicone (MAALOX PLUS) 200-200-20 MG/5ML suspension 30 mL, 30 mL, Oral, Q6H

## 2025-02-19 VITALS
RESPIRATION RATE: 16 BRPM | HEIGHT: 75 IN | HEART RATE: 72 BPM | TEMPERATURE: 97.3 F | WEIGHT: 275 LBS | DIASTOLIC BLOOD PRESSURE: 67 MMHG | OXYGEN SATURATION: 98 % | SYSTOLIC BLOOD PRESSURE: 137 MMHG | BODY MASS INDEX: 34.19 KG/M2

## 2025-02-19 PROCEDURE — 6370000000 HC RX 637 (ALT 250 FOR IP): Performed by: PSYCHIATRY & NEUROLOGY

## 2025-02-19 PROCEDURE — 6370000000 HC RX 637 (ALT 250 FOR IP)

## 2025-02-19 RX ADMIN — NICOTINE POLACRILEX 2 MG: 2 LOZENGE ORAL at 08:19

## 2025-02-19 RX ADMIN — ARIPIPRAZOLE 10 MG: 10 TABLET ORAL at 08:19

## 2025-02-19 RX ADMIN — HYDROXYZINE HYDROCHLORIDE 50 MG: 50 TABLET, FILM COATED ORAL at 08:19

## 2025-02-19 RX ADMIN — ACETAMINOPHEN 650 MG: 325 TABLET ORAL at 05:50

## 2025-02-19 RX ADMIN — LAMOTRIGINE 25 MG: 25 TABLET ORAL at 08:19

## 2025-02-19 RX ADMIN — NICOTINE POLACRILEX 2 MG: 2 LOZENGE ORAL at 06:00

## 2025-02-19 RX ADMIN — AMLODIPINE BESYLATE 2.5 MG: 5 TABLET ORAL at 08:18

## 2025-02-19 ASSESSMENT — PAIN DESCRIPTION - LOCATION: LOCATION: FOOT

## 2025-02-19 ASSESSMENT — PAIN DESCRIPTION - ORIENTATION: ORIENTATION: RIGHT

## 2025-02-19 ASSESSMENT — PAIN SCALES - GENERAL
PAINLEVEL_OUTOF10: 3
PAINLEVEL_OUTOF10: 10
PAINLEVEL_OUTOF10: 0

## 2025-02-19 NOTE — DISCHARGE INSTR - DIET

## 2025-02-19 NOTE — DISCHARGE INSTRUCTIONS
Information:  Medications:              Medication summary provided              I understand that I should take only the medications on my list.                           -why and when I need to take each medicine.                           -which side effects to watch for.                           -that I should carry my medication information at all times in case of                           Emergency situations.               I will take all of my medicines to follow up appointments.                           -check with my physician or pharmacist before taking any new                          Medication, over the counter product or drink alcohol.                          -Ask about food, drug or dietary supplement interactions.                          -discard old lists and update records with medication providers.     Notify Physician:  Notify physician if you notice:              Always call 911 if you feel your life is in danger  In case of an emergency call 911 immediately!  If 911 is not available call your local emergency medical system for help     Behavioral Health Follow Up:  Original Referral Source:ED  Discharge Diagnosis: Polysubstance abuse (HCC) [F19.10]  Acute psychosis (HCC) [F23]  Depression with suicidal ideation [F32.A, R45.851]  Recommendations for Level of Care: follow up with appointments and take your meds  Patient status at discharge: stable  My hospital  was: [facility]  Aftercare plan faxed: yes              -faxed by: staff              -date: 2/19/25              -time: 1700  Prescriptions: [facility] pharmacy     Smoking: Quit Smoking.              Call the [employer]'s smoking quitline at 9-457-51I-QUIT  Know the signs of a heart attack              If you have any of the following symptoms call 911 immediately, do not wait more               Than five minutes.               1. Pressure, fullness and/ or squeezing in the center of the chest spreading to

## 2025-02-19 NOTE — CARE COORDINATION
BHI Biopsychosocial Assessment    Current Level of Psychosocial Functioning     Independent XX  Dependent    Minimal Assist     Comments:    Psychosocial High Risk Factors (check all that apply)    Unable to obtain meds   Chronic illness/pain    Substance abuse XX  Lack of Family Support   Financial stress   Isolation   Inadequate Community Resources XX  Suicide attempt(s) XX  Not taking medications   Victim of crime   Developmental Delay  Unable to manage personal needs    Age 65 or older   Homeless XX  No transportation   Readmission within 30 days  Unemployment  Traumatic Event    Comments:   Psychiatric Advanced Directives: n/a    Family to Involve in Treatment: denies    Sexual Orientation: n/a     Patient Strengths: Patient has social support, insurance    Patient Barriers: Substance abuse, homelessness      Opiate Education Provided: n/a       CMHC/mental health history: Reports a hx of outpatient services in the [place] area but is not sure where. Recently at [facility], detox, and residential program    Plan of Care   medication management, group/individual therapies, family meetings, psycho -education, treatment team meetings to assist with stabilization    Initial Discharge Plan: Potential dc to [facility] in [place] vs [facility]'s home in [place], [place]. Currently does not have address for [facility].         Clinical Summary: Patient is a 24 year old male admitted to [facility] for suicidal ideation. Patient reports a hx of admissions \"all over [place]\". He states this is his first hospitalization in [place]. Patient states he moved to [place] one year ago due to being incarcerated at [facility] for 8 months. He states he was released from [facility] and sent directly to [facility]. He reports being at [facility] for two weeks before being removed from the program for not engaging in groups. Patient states he then went to [facility] and for the past couple of months has been staying 
Name: Ambrocio Hill    : 2000    Auth number: [id number]     Discharge Date: 24    Destination: [facility]     Discharge Medications:      Medication List        START taking these medications      amLODIPine 2.5 MG tablet  Commonly known as: NORVASC  Take 1 tablet by mouth daily  Notes to patient: Blood pressure     hydrOXYzine HCl 50 MG tablet  Commonly known as: ATARAX  Take 1 tablet by mouth 3 times daily as needed for Anxiety  Notes to patient: Anxiety     nicotine polacrilex 2 MG lozenge  Commonly known as: COMMIT  Take 1 lozenge by mouth every hour as needed for Smoking cessation  Notes to patient: Nicotine replacement     traZODone 50 MG tablet  Commonly known as: DESYREL  Take 1 tablet by mouth nightly as needed for Sleep  Notes to patient: Sleep            CHANGE how you take these medications      ARIPiprazole  MG Srer  Commonly known as: ABILIFY MAINTENA  Inject 400 mg into the muscle every 28 days  Start taking on: 2025  What changed:   how to take this  when to take this  Notes to patient: Mood     lamoTRIgine 25 MG tablet  Commonly known as: LAMICTAL  Take 1 tablet by mouth daily  What changed: when to take this  Notes to patient: Mood            CONTINUE taking these medications      acetaminophen 500 MG tablet  Commonly known as: TYLENOL  Take 1 tablet by mouth 4 times daily as needed for Pain  Notes to patient: Pain     ibuprofen 200 MG tablet  Commonly known as: ADVIL;MOTRIN  Take 3 tablets by mouth every 8 hours as needed for Pain or Fever  Notes to patient: Pain            STOP taking these medications      cetirizine 10 MG tablet  Commonly known as: ZYRTEC     prazosin 1 MG capsule  Commonly known as: MINIPRESS     QUEtiapine 300 MG tablet  Commonly known as: SEROQUEL               Where to Get Your Medications        These medications were sent to [facility] [address] -  [phone] [facility] [phone] 
Writer spoke with Ambrocio about his discharge plans. He states he plans to stay at the [facility] and would like to continue to followup with [facility].   
[facility] [address]  Phone: [phone]     Phone: [phone]    [facility]    [facility] Department of Psychiatry  [address] [facility] [address]  [address] [place], [place] [address]  Phone: [phone]     Phone: [phone]    [facility]      [facility]  [address] [facility] [address]    [address] [facility] [address]  Phone: [phone]     Phone: [phone]    [facility]   [address] [facility] [address]   [address] [facility] [address] [facility] [address]  Phone: [phone]     Phone: [phone] Ext: [phone]    [facility]  [address] [facility] [address] or  [address] [facility] [address] or  [address] [facility] [address]  Phone: [phone]    [facility]  [facility]  [address] [facility] [address]    [address] [place] [address]  [facility] [address]     Phone: [phone]  Phone: [phone]          [facility]  [address] [facility] [address]   [address] [facility] [address]  Phone: [phone]     Phone: [phone]

## 2025-02-19 NOTE — GROUP NOTE
Psych-Ed/Relapse Prevention Group Note        Date: February 19, 2025 Start Time: 11am  End Time: 11:30am      Number of Participants in Group & Unit Census:  4/17    Topic: Patient Advisory    Goal of Group:Patient will identify benefits of mental health services and hospital admission.  Patient will identify beneficial ways to advocate for care.      Comments:     Patient did not participate in Psych-Ed/Relapse Prevention group, despite staff encouragement and explanation of benefits.  Patient remain seclusive to self.  Q15 minute safety checks maintained for patient safety and will continue to encourage patient to attend unit programming.         Signature:  DANYEL CamposS

## 2025-02-19 NOTE — PLAN OF CARE
Problem: Psychosis  Goal: Will report no hallucinations or delusions  Description: INTERVENTIONS:  1. Administer medication as  ordered  2. Assist with reality testing to support increasing orientation  3. Assess if patient's hallucinations or delusions are encouraging self harm or harm to others and intervene as appropriate  2/18/2025 2119 by Farrah Pena LPN  Note: Patient denies suicidal thoughts and hallucinations. He stated depression and anxiety are improved and he states readiness for discharge tomorrow. He has been isolative to his room and resting this evening. Q15min safety checks continue     Problem: Behavior  Goal: Pt/Family maintain appropriate behavior and adhere to behavioral management agreement, if implemented  Description: INTERVENTIONS:  1. Assess patient/family's coping skills and  non-compliant behavior (including use of illegal substances)  2. Notify security of behavior or suspected illegal substances which indicate the need for search of the family and/or belongings  3. Encourage verbalization of thoughts and concerns in a socially appropriate manner  4. Utilize positive, consistent limit setting strategies supporting safety of patient, staff and others  5. Encourage participation in the decision making process about the behavioral management agreement  6. If a visitor's behavior poses a threat to safety call refer to organization policy.  7. Initiate consult with , Psychosocial CNS, Spiritual Care as appropriate  2/18/2025 2119 by Farrah Pena LPN  Note: Patient denies thoughts of self harm at this time. Patient agrees to seek out staff if they begin having thoughts of harming self or they need to talk. Q15min safety checks continue

## 2025-02-19 NOTE — TRANSITION OF CARE
Behavioral Health Transition Record    Patient Name: Ambrocio Hill  YOB: 2000   Medical Record Number: [id number]  Date of Admission: 2/14/2025  5:47 PM   Date of Discharge: 2/19/2025    Attending Provider: Nabeel Muller MD   Discharging Provider: Nabeel Muller MD  To contact this individual call [phone] and ask the  to page.  If unavailable, ask to be transferred to Behavioral Health Provider on call.  A Behavioral Health Provider will be available on call 24/7 and during holidays.    Primary Care Provider: No primary care provider on file.    Allergies   Allergen Reactions    Benadryl [Diphenhydramine]      Pt reports he cannot breathe through nose       Reason for Admission: Reason for Admission to Psychiatric Unit:  Threat to self requiring 24 hour professional observation  A mental disorder causing major disability in social, interpersonal, occupational, and/or educational functioning that is leading to dangerous or life-threatening functioning, and that can only be addressed in an acute inpatient setting   Concerns about patient's safety in the community  Past Mental Health Diagnosis: a history of  Major Depression, Schizoaffective Disorder, Anxiety Disorder, Prior suicide attempt, and Alcohol and/or Drug Use Disorder  Triggering event or precipitating factor: Housing instability, Financial instability, Alcohol/Drug Relapse, and Relationship Issues  Length of time/duration of triggering event: Days  Legal Status: Voluntary     CHIEF COMPLAINT: Depression with suicidal ideation, psychosis    Admission Diagnosis: Polysubstance abuse (HCC) [F19.10]  Acute psychosis (HCC) [F23]  Depression with suicidal ideation [F32.A, R45.851]    * No surgery found *    Results for orders placed or performed during the hospital encounter of 02/14/25   CBC with Auto Differential   Result Value Ref Range    WBC 11.8 (H) 3.5 - 11.0 k/uL    RBC 5.53 4.5 - 5.9 m/uL    Hemoglobin 16.0 13.5 - 17.5 g/dL

## 2025-02-19 NOTE — BH NOTE
Behavioral Health [facility]  Discharge Note    Pt discharged with followings belongings:   Dental Appliances: None  Vision - Corrective Lenses: None  Hearing Aid: None  Jewelry: None  Body Piercings Removed: N/A  Clothing: Pants, Shirt, Socks, Undergarments, Other (Comment) (see written)  Other Valuables: Other (Comment) (see written)   Valuables sent home with patient. Patient educated on aftercare instructions: yes.  Information faxed to [facility] by nurse at 1:13 PM. Patient verbalize understanding of AVS: yes.    Status EXAM upon discharge:  Mental Status and Behavioral Exam  Normal: No  Level of Assistance: Independent/Self  Facial Expression: Flat  Affect: Appropriate  Level of Consciousness: Alert  Frequency of Checks: 4 times per hour, close  Mood:Normal: No  Mood: Anxious  Motor Activity:Normal: Yes  Eye Contact: Good  Observed Behavior: Cooperative  Sexual Misconduct History: Current - no  Preception: Aldie to person, Aldie to time, Aldie to place, Aldie to situation  Attention:Normal: Yes  Thought Processes: Unremarkable  Thought Content:Normal: Yes  Depression Symptoms: No problems reported or observed.  Anxiety Symptoms: Generalized  Lee Ann Symptoms: No problems reported or observed.  Hallucinations: None  Delusions: No  Memory:Normal: Yes  Insight and Judgment: Yes  Insight and Judgment: Poor judgment    Tobacco Screening:  Practical Counseling, on admission, say X, if applicable and completed (first 3 are required if patient doesn't refuse):            ( ) Recognizing danger situations (included triggers and roadblocks)                    ( ) Coping skills (new ways to manage stress,relaxation techniques, changing routine, distraction)                                                           ( ) Basic information about quitting (benefits of quitting, techniques in how to quit, available resources  ( ) [facility]                                             
Emergency Medication Follow-Up Note:    PRN medication of Haldol 5 mg tablet, Ativan 2 mg tablet  was effective as evidence by patient pacing less and no self-reports of anxiety. Patient denies medication side effects. Will continue to monitor and provide support as needed.    
Emergency PRN Medication Administration Note:      Patient is Agitated as evidence by continuously pacing the unit, frequently reporting extreme anxiety, and requesting medication that is stronger than the Atarax that he took this morning. Staff attempted to find and relieve the distress by Talking to patient, Refocusing on new activity, and Offering suggestions Patient is currently accepting of PRN medication. Medication Administered as prescribed: Haldol 5 mg tablet, Ativan 2 mg tablet. Patient Tolerated medication administration.   Will continue to monitor, offer support, and reassess.   
Patient didn't participate  in the morning  orientation group despite staff invite to attend. Patient preferred to sleep.  
Patient given tobacco quitline number 64784378684 at this time, refusing to call at this time, states \" I just dont want to quit now\"- patient given information as to the dangers of long term tobacco use. Continue to reinforce the importance of tobacco cessation.    
Provider notified of best practice advisory suggesting to place patient on suicide precautions. Provider to discontinue the order as patient does not meet criteria for suicide precautions at this time. Continue to observe patient on every 15 minute checks.    
regarding your bill: Call HELP program [phone]     Suicide Hotline ([facility] Crisis Care Line)  (713) 131-8537       To obtain results of pending studies call Medical Records at: [phone]     For emergencies and 24 hour/7 days a week contact information:  [phone]    Substance Use Treatment options provided by [employer] - a website/lynne sponsored by the Mental Health & Recovery Services Board of [facility]. For more information please visit https://[email or web address]/    *CHRIS resources were offered to patient throughout admission and at time of discharge. This list of [facility] CHRIS providers was provided to patient:     [facility]  [address] [facility] [address]    [address] [facility] [address]  Phone: [phone]      Phone: [phone]    [facility] [place] [facility]   [address] [facility] [address]    [address] [facility] [address]  Phone: [phone]      Phone: [phone]    [facility]     [facility]  [address] [facility] [address]     [address] [facility] [address]  Phone: [phone]      Phone: [phone]    [facility]      [facility]   [address] [facility] [address]  [address] [facility] [address]  Phone: [phone]      Phone: [phone]    [facility]  [address] [facility] [facility] [address]    [address] [place] [address]  Phone: [phone]      Phone:  [phone]    [employer]       [facility]  [address] [address]    [address] [facility] [address]  Phone: [phone]      Phone: [phone]    [facility]  [address] [facility] [address]    [address] [facility] [address]  Phone: [phone]      Phone: [phone]    [facility]   [facility]  [address]

## 2025-02-19 NOTE — GROUP NOTE
Group Therapy Note    Date: 2/19/2025    Group Start Time: 0927  Group End Time: 0949  Group Topic: Group Documentation    [facility] Adult    Mulu Rivera LPN        Group Therapy Note         Patient did not participate in Goals  group, despite staff encouragement and explanation of benefits.  Patient remain seclusive to self.  Q15 minute safety checks maintained for patient safety and will continue to encourage patient to attend unit programming.           Signature:  Mulu Rivera LPN

## 2025-02-19 NOTE — DISCHARGE SUMMARY
Provider Discharge Summary     Patient ID:  Ambrocio Hill  [id number]  24 y.o.  2000    Admit date: 2/14/2025    Discharge date and time: 2/19/2025  3:28 PM     Admitting Physician: Nabeel Muller MD     Discharge Physician: Nabeel Muller MD    Admission Diagnoses: Polysubstance abuse (HCC) [F19.10]  Acute psychosis (HCC) [F23]  Depression with suicidal ideation [F32.A, R45.851]    Discharge Diagnoses:      Schizoaffective disorder, bipolar type (HCC)     Patient Active Problem List   Diagnosis Code    Acute psychosis (HCC) F23    Schizoaffective disorder, bipolar type (HCC) F25.0    Abnormal LFTs R79.89    Depression with suicidal ideation F32.A, R45.851        Admission Condition: poor    Discharged Condition: stable    Indication for Admission: threat to self    History of Present Illnes (present tense wording is of findings from admission exam and are not necessarily indicative of current findings):   Ambrocio Hill is a 24 y.o. male who has a past medical history of mental illness and polysubstance use. Patient presented to the ED from [facility] endorsing suicidal ideation. Stating \"I don't want to be alive, nothing is keeping me alive\". [facility] did not admit with expressed concerns of patient's medical condition of his feet. He is reported as wandering outside for the past 3 days without wear of shoes. He presented to the ED with callsuses on soles of feet with no evidence of frostbite or tissue damage.He reported homelessness and polysubstance use. He was at [facility] 4 months ago for drug services. Reports he is snorting his Seroquel to calm down. He endorses audiovisual hallucinations and history of schizoaffective disorder. He reports multiple psychiatric hospitalizations in [place] and [place]. This appears to be his first admission to [facility].      The patient is seen today for initial assessment.  He agrees to conduct interview in a private room with door open. He pleasant and readily engages in

## 2025-03-16 ENCOUNTER — HOSPITAL ENCOUNTER (INPATIENT)
Age: 25
LOS: 5 days | Discharge: HOME | End: 2025-03-21
Attending: STUDENT IN AN ORGANIZED HEALTH CARE EDUCATION/TRAINING PROGRAM | Admitting: PSYCHIATRY & NEUROLOGY
Payer: MEDICAID

## 2025-03-16 DIAGNOSIS — R45.851 SUICIDAL IDEATION: Primary | ICD-10-CM

## 2025-03-16 LAB
ALBUMIN SERPL-MCNC: 4.6 G/DL (ref 3.5–5.2)
ALP SERPL-CCNC: 103 U/L (ref 40–129)
ALT SERPL-CCNC: 45 U/L (ref 10–50)
AMPHET UR QL SCN: NEGATIVE
ANION GAP SERPL CALCULATED.3IONS-SCNC: 12 MMOL/L (ref 9–16)
AST SERPL-CCNC: 31 U/L (ref 10–50)
BARBITURATES UR QL SCN: NEGATIVE
BASOPHILS # BLD: 0.1 K/UL (ref 0–0.2)
BASOPHILS NFR BLD: 1 % (ref 0–2)
BENZODIAZ UR QL: NEGATIVE
BILIRUB SERPL-MCNC: <0.2 MG/DL (ref 0–1.2)
BILIRUB UR QL STRIP: NEGATIVE
BUN SERPL-MCNC: 16 MG/DL (ref 6–20)
CALCIUM SERPL-MCNC: 9.5 MG/DL (ref 8.6–10.4)
CANNABINOIDS UR QL SCN: NEGATIVE
CHLORIDE SERPL-SCNC: 106 MMOL/L (ref 98–107)
CLARITY UR: CLEAR
CO2 SERPL-SCNC: 22 MMOL/L (ref 20–31)
COCAINE UR QL SCN: NEGATIVE
COLOR UR: YELLOW
COMMENT: NORMAL
CREAT SERPL-MCNC: 1 MG/DL (ref 0.7–1.2)
EOSINOPHIL # BLD: 0.2 K/UL (ref 0–0.4)
EOSINOPHILS RELATIVE PERCENT: 1 % (ref 0–4)
ERYTHROCYTE [DISTWIDTH] IN BLOOD BY AUTOMATED COUNT: 15.3 % (ref 11.5–14.9)
ETHANOL PERCENT: 0 %
ETHANOLAMINE SERPL-MCNC: <10 MG/DL (ref 0–0.08)
FENTANYL UR QL: NEGATIVE
GFR, ESTIMATED: >90 ML/MIN/1.73M2
GLUCOSE SERPL-MCNC: 105 MG/DL (ref 74–99)
GLUCOSE UR STRIP-MCNC: NEGATIVE MG/DL
HCT VFR BLD AUTO: 46.8 % (ref 41–53)
HGB BLD-MCNC: 15.5 G/DL (ref 13.5–17.5)
HGB UR QL STRIP.AUTO: NEGATIVE
KETONES UR STRIP-MCNC: NEGATIVE MG/DL
LEUKOCYTE ESTERASE UR QL STRIP: NEGATIVE
LYMPHOCYTES NFR BLD: 2.2 K/UL (ref 1–4.8)
LYMPHOCYTES RELATIVE PERCENT: 19 % (ref 24–44)
MCH RBC QN AUTO: 29.1 PG (ref 26–34)
MCHC RBC AUTO-ENTMCNC: 33.2 G/DL (ref 31–37)
MCV RBC AUTO: 87.7 FL (ref 80–100)
METHADONE UR QL: NEGATIVE
MONOCYTES NFR BLD: 1.1 K/UL (ref 0.1–1.3)
MONOCYTES NFR BLD: 9 % (ref 1–7)
NEUTROPHILS NFR BLD: 70 % (ref 36–66)
NEUTS SEG NFR BLD: 8.2 K/UL (ref 1.3–9.1)
NITRITE UR QL STRIP: NEGATIVE
OPIATES UR QL SCN: NEGATIVE
OXYCODONE UR QL SCN: NEGATIVE
PCP UR QL SCN: NEGATIVE
PH UR STRIP: 5.5 [PH] (ref 5–8)
PLATELET # BLD AUTO: 315 K/UL (ref 150–450)
PMV BLD AUTO: 7.3 FL (ref 6–12)
POTASSIUM SERPL-SCNC: 4.1 MMOL/L (ref 3.7–5.3)
PROT SERPL-MCNC: 7.7 G/DL (ref 6.6–8.7)
PROT UR STRIP-MCNC: NEGATIVE MG/DL
RBC # BLD AUTO: 5.34 M/UL (ref 4.5–5.9)
SODIUM SERPL-SCNC: 140 MMOL/L (ref 136–145)
SP GR UR STRIP: 1.02 (ref 1–1.03)
TEST INFORMATION: NORMAL
UROBILINOGEN UR STRIP-ACNC: NORMAL EU/DL (ref 0–1)
WBC OTHER # BLD: 11.7 K/UL (ref 3.5–11)

## 2025-03-16 PROCEDURE — 80307 DRUG TEST PRSMV CHEM ANLYZR: CPT

## 2025-03-16 PROCEDURE — 80053 COMPREHEN METABOLIC PANEL: CPT

## 2025-03-16 PROCEDURE — 6370000000 HC RX 637 (ALT 250 FOR IP): Performed by: PSYCHIATRY & NEUROLOGY

## 2025-03-16 PROCEDURE — 1240000000 HC EMOTIONAL WELLNESS R&B

## 2025-03-16 PROCEDURE — G0480 DRUG TEST DEF 1-7 CLASSES: HCPCS

## 2025-03-16 PROCEDURE — 99285 EMERGENCY DEPT VISIT HI MDM: CPT

## 2025-03-16 PROCEDURE — 36415 COLL VENOUS BLD VENIPUNCTURE: CPT

## 2025-03-16 PROCEDURE — 85025 COMPLETE CBC W/AUTO DIFF WBC: CPT

## 2025-03-16 PROCEDURE — 81003 URINALYSIS AUTO W/O SCOPE: CPT

## 2025-03-16 RX ORDER — IBUPROFEN 400 MG/1
400 TABLET, FILM COATED ORAL EVERY 6 HOURS PRN
Status: DISCONTINUED | OUTPATIENT
Start: 2025-03-16 | End: 2025-03-21 | Stop reason: HOSPADM

## 2025-03-16 RX ORDER — POLYETHYLENE GLYCOL 3350 17 G/17G
17 POWDER, FOR SOLUTION ORAL DAILY PRN
Status: DISCONTINUED | OUTPATIENT
Start: 2025-03-16 | End: 2025-03-21 | Stop reason: HOSPADM

## 2025-03-16 RX ORDER — CHLORPROMAZINE HCI 25 MG/ML
50 INJECTION INTRAMUSCULAR 3 TIMES DAILY PRN
Status: DISCONTINUED | OUTPATIENT
Start: 2025-03-16 | End: 2025-03-17

## 2025-03-16 RX ORDER — TRAZODONE HYDROCHLORIDE 50 MG/1
50 TABLET ORAL NIGHTLY PRN
Status: DISCONTINUED | OUTPATIENT
Start: 2025-03-17 | End: 2025-03-16

## 2025-03-16 RX ORDER — HYDROXYZINE HYDROCHLORIDE 50 MG/1
50 TABLET, FILM COATED ORAL 3 TIMES DAILY PRN
Status: DISCONTINUED | OUTPATIENT
Start: 2025-03-16 | End: 2025-03-21 | Stop reason: HOSPADM

## 2025-03-16 RX ORDER — CHLORPROMAZINE HYDROCHLORIDE 25 MG/1
50 TABLET, FILM COATED ORAL 3 TIMES DAILY PRN
Status: DISCONTINUED | OUTPATIENT
Start: 2025-03-16 | End: 2025-03-17

## 2025-03-16 RX ORDER — MAGNESIUM HYDROXIDE/ALUMINUM HYDROXICE/SIMETHICONE 120; 1200; 1200 MG/30ML; MG/30ML; MG/30ML
30 SUSPENSION ORAL EVERY 6 HOURS PRN
Status: DISCONTINUED | OUTPATIENT
Start: 2025-03-16 | End: 2025-03-21 | Stop reason: HOSPADM

## 2025-03-16 RX ORDER — ACETAMINOPHEN 325 MG/1
650 TABLET ORAL EVERY 6 HOURS PRN
Status: DISCONTINUED | OUTPATIENT
Start: 2025-03-16 | End: 2025-03-21 | Stop reason: HOSPADM

## 2025-03-16 RX ORDER — HALOPERIDOL 5 MG/1
5 TABLET ORAL EVERY 6 HOURS PRN
Status: DISCONTINUED | OUTPATIENT
Start: 2025-03-16 | End: 2025-03-17

## 2025-03-16 RX ORDER — LORAZEPAM 1 MG/1
2 TABLET ORAL EVERY 6 HOURS PRN
Status: DISCONTINUED | OUTPATIENT
Start: 2025-03-16 | End: 2025-03-17

## 2025-03-16 RX ORDER — TRAZODONE HYDROCHLORIDE 50 MG/1
50 TABLET ORAL NIGHTLY PRN
Status: DISCONTINUED | OUTPATIENT
Start: 2025-03-16 | End: 2025-03-21 | Stop reason: HOSPADM

## 2025-03-16 RX ORDER — POLYETHYLENE GLYCOL 3350 17 G
2 POWDER IN PACKET (EA) ORAL
Status: DISCONTINUED | OUTPATIENT
Start: 2025-03-16 | End: 2025-03-21 | Stop reason: HOSPADM

## 2025-03-16 RX ADMIN — NICOTINE POLACRILEX 2 MG: 2 LOZENGE ORAL at 21:50

## 2025-03-16 RX ADMIN — TRAZODONE HYDROCHLORIDE 50 MG: 50 TABLET ORAL at 22:06

## 2025-03-16 ASSESSMENT — SLEEP AND FATIGUE QUESTIONNAIRES
DO YOU HAVE DIFFICULTY SLEEPING: YES
DO YOU USE A SLEEP AID: NO
SLEEP PATTERN: DIFFICULTY FALLING ASLEEP;DISTURBED/INTERRUPTED SLEEP
AVERAGE NUMBER OF SLEEP HOURS: 4

## 2025-03-16 ASSESSMENT — PATIENT HEALTH QUESTIONNAIRE - PHQ9
SUM OF ALL RESPONSES TO PHQ QUESTIONS 1-9: 2
1. LITTLE INTEREST OR PLEASURE IN DOING THINGS: SEVERAL DAYS
SUM OF ALL RESPONSES TO PHQ QUESTIONS 1-9: 2
2. FEELING DOWN, DEPRESSED OR HOPELESS: SEVERAL DAYS

## 2025-03-16 ASSESSMENT — LIFESTYLE VARIABLES
HOW MANY STANDARD DRINKS CONTAINING ALCOHOL DO YOU HAVE ON A TYPICAL DAY: 10 OR MORE
HOW OFTEN DO YOU HAVE A DRINK CONTAINING ALCOHOL: 4 OR MORE TIMES A WEEK
HOW OFTEN DO YOU HAVE A DRINK CONTAINING ALCOHOL: 4 OR MORE TIMES A WEEK
HOW MANY STANDARD DRINKS CONTAINING ALCOHOL DO YOU HAVE ON A TYPICAL DAY: 7 TO 9

## 2025-03-16 ASSESSMENT — PAIN - FUNCTIONAL ASSESSMENT: PAIN_FUNCTIONAL_ASSESSMENT: NONE - DENIES PAIN

## 2025-03-17 PROBLEM — R45.851 SUICIDAL IDEATION: Status: ACTIVE | Noted: 2025-03-17

## 2025-03-17 PROCEDURE — 99222 1ST HOSP IP/OBS MODERATE 55: CPT | Performed by: PSYCHIATRY & NEUROLOGY

## 2025-03-17 PROCEDURE — 2500000003 HC RX 250 WO HCPCS: Performed by: PSYCHIATRY & NEUROLOGY

## 2025-03-17 PROCEDURE — APPSS60 APP SPLIT SHARED TIME 46-60 MINUTES: Performed by: NURSE PRACTITIONER

## 2025-03-17 PROCEDURE — 6370000000 HC RX 637 (ALT 250 FOR IP): Performed by: PSYCHIATRY & NEUROLOGY

## 2025-03-17 PROCEDURE — 6370000000 HC RX 637 (ALT 250 FOR IP): Performed by: INTERNAL MEDICINE

## 2025-03-17 PROCEDURE — 99222 1ST HOSP IP/OBS MODERATE 55: CPT | Performed by: INTERNAL MEDICINE

## 2025-03-17 PROCEDURE — 6360000002 HC RX W HCPCS: Performed by: PSYCHIATRY & NEUROLOGY

## 2025-03-17 PROCEDURE — 2040000000 HC PSYCH ICU R&B

## 2025-03-17 RX ORDER — THIAMINE MONONITRATE (VIT B1) 100 MG
100 TABLET ORAL DAILY
Status: ON HOLD | COMMUNITY
End: 2025-03-20 | Stop reason: HOSPADM

## 2025-03-17 RX ORDER — NALTREXONE 380 MG
380 KIT INTRAMUSCULAR
COMMUNITY

## 2025-03-17 RX ORDER — OMEPRAZOLE 20 MG/1
20 CAPSULE, DELAYED RELEASE ORAL DAILY
Status: ON HOLD | COMMUNITY
End: 2025-03-20 | Stop reason: HOSPADM

## 2025-03-17 RX ORDER — MIRTAZAPINE 7.5 MG/1
7.5 TABLET, FILM COATED ORAL NIGHTLY PRN
Status: ON HOLD | COMMUNITY
End: 2025-03-20 | Stop reason: HOSPADM

## 2025-03-17 RX ORDER — NALTREXONE HYDROCHLORIDE 50 MG/1
50 TABLET, FILM COATED ORAL DAILY
Status: ON HOLD | COMMUNITY
End: 2025-03-18

## 2025-03-17 RX ORDER — MELATONIN 10 MG
10 CAPSULE ORAL NIGHTLY PRN
Status: ON HOLD | COMMUNITY
End: 2025-03-20 | Stop reason: HOSPADM

## 2025-03-17 RX ORDER — HYDROXYZINE HYDROCHLORIDE 50 MG/1
50 TABLET, FILM COATED ORAL EVERY 8 HOURS PRN
Status: ON HOLD | COMMUNITY
End: 2025-03-20 | Stop reason: HOSPADM

## 2025-03-17 RX ORDER — HALOPERIDOL 5 MG/ML
5 INJECTION INTRAMUSCULAR EVERY 6 HOURS PRN
Status: DISCONTINUED | OUTPATIENT
Start: 2025-03-17 | End: 2025-03-21 | Stop reason: HOSPADM

## 2025-03-17 RX ORDER — RISPERIDONE 2 MG/1
2 TABLET, ORALLY DISINTEGRATING ORAL 2 TIMES DAILY
Status: DISCONTINUED | OUTPATIENT
Start: 2025-03-17 | End: 2025-03-20

## 2025-03-17 RX ORDER — AMLODIPINE BESYLATE 5 MG/1
2.5 TABLET ORAL DAILY
Status: DISCONTINUED | OUTPATIENT
Start: 2025-03-17 | End: 2025-03-21 | Stop reason: HOSPADM

## 2025-03-17 RX ORDER — ARIPIPRAZOLE 960 MG/3.2ML
960 INJECTION, SUSPENSION, EXTENDED RELEASE INTRAMUSCULAR
Status: ON HOLD | COMMUNITY
End: 2025-03-20 | Stop reason: HOSPADM

## 2025-03-17 RX ORDER — DIVALPROEX SODIUM 500 MG/1
500 TABLET, FILM COATED, EXTENDED RELEASE ORAL 2 TIMES DAILY
Status: DISCONTINUED | OUTPATIENT
Start: 2025-03-17 | End: 2025-03-21 | Stop reason: HOSPADM

## 2025-03-17 RX ORDER — HALOPERIDOL 5 MG/1
5 TABLET ORAL EVERY 6 HOURS PRN
Status: DISCONTINUED | OUTPATIENT
Start: 2025-03-17 | End: 2025-03-21 | Stop reason: HOSPADM

## 2025-03-17 RX ADMIN — NICOTINE POLACRILEX 2 MG: 2 LOZENGE ORAL at 08:54

## 2025-03-17 RX ADMIN — CHLORPROMAZINE HYDROCHLORIDE 50 MG: 25 TABLET, FILM COATED ORAL at 04:04

## 2025-03-17 RX ADMIN — DIVALPROEX SODIUM 500 MG: 500 TABLET, EXTENDED RELEASE ORAL at 20:30

## 2025-03-17 RX ADMIN — CHLORPROMAZINE HYDROCHLORIDE 50 MG: 25 INJECTION INTRAMUSCULAR at 08:02

## 2025-03-17 RX ADMIN — NICOTINE POLACRILEX 2 MG: 2 LOZENGE ORAL at 10:28

## 2025-03-17 RX ADMIN — NICOTINE POLACRILEX 2 MG: 2 LOZENGE ORAL at 06:32

## 2025-03-17 RX ADMIN — NICOTINE POLACRILEX 2 MG: 2 LOZENGE ORAL at 20:05

## 2025-03-17 RX ADMIN — NICOTINE POLACRILEX 2 MG: 2 LOZENGE ORAL at 16:57

## 2025-03-17 RX ADMIN — NICOTINE POLACRILEX 2 MG: 2 LOZENGE ORAL at 12:51

## 2025-03-17 RX ADMIN — IBUPROFEN 400 MG: 400 TABLET, FILM COATED ORAL at 19:23

## 2025-03-17 RX ADMIN — NICOTINE POLACRILEX 2 MG: 2 LOZENGE ORAL at 14:41

## 2025-03-17 RX ADMIN — ZIPRASIDONE MESYLATE 20 MG: 20 INJECTION, POWDER, LYOPHILIZED, FOR SOLUTION INTRAMUSCULAR at 08:55

## 2025-03-17 RX ADMIN — NICOTINE POLACRILEX 2 MG: 2 LOZENGE ORAL at 18:55

## 2025-03-17 RX ADMIN — ACETAMINOPHEN 650 MG: 325 TABLET, FILM COATED ORAL at 15:56

## 2025-03-17 RX ADMIN — AMLODIPINE BESYLATE 2.5 MG: 5 TABLET ORAL at 16:14

## 2025-03-17 RX ADMIN — NICOTINE POLACRILEX 2 MG: 2 LOZENGE ORAL at 22:08

## 2025-03-17 RX ADMIN — IBUPROFEN 400 MG: 400 TABLET, FILM COATED ORAL at 03:17

## 2025-03-17 RX ADMIN — HYDROXYZINE HYDROCHLORIDE 50 MG: 50 TABLET, FILM COATED ORAL at 03:18

## 2025-03-17 RX ADMIN — TRAZODONE HYDROCHLORIDE 50 MG: 50 TABLET ORAL at 20:30

## 2025-03-17 RX ADMIN — RISPERIDONE 2 MG: 2 TABLET, ORALLY DISINTEGRATING ORAL at 20:30

## 2025-03-17 ASSESSMENT — PAIN SCALES - GENERAL
PAINLEVEL_OUTOF10: 3
PAINLEVEL_OUTOF10: 10
PAINLEVEL_OUTOF10: 0

## 2025-03-17 ASSESSMENT — PAIN DESCRIPTION - LOCATION
LOCATION: HEAD
LOCATION: GENERALIZED
LOCATION: ARM

## 2025-03-17 ASSESSMENT — PAIN DESCRIPTION - DESCRIPTORS
DESCRIPTORS: ACHING
DESCRIPTORS: ACHING

## 2025-03-17 ASSESSMENT — LIFESTYLE VARIABLES
HOW OFTEN DO YOU HAVE A DRINK CONTAINING ALCOHOL: PATIENT DECLINED
HOW MANY STANDARD DRINKS CONTAINING ALCOHOL DO YOU HAVE ON A TYPICAL DAY: PATIENT DECLINED
HOW OFTEN DO YOU HAVE A DRINK CONTAINING ALCOHOL: PATIENT DECLINED
HOW MANY STANDARD DRINKS CONTAINING ALCOHOL DO YOU HAVE ON A TYPICAL DAY: PATIENT DECLINED

## 2025-03-17 NOTE — ED PROVIDER NOTES
EMERGENCY DEPARTMENT ENCOUNTER    Pt Name: Ambrocio Hill  MRN: [id number]  Birthdate 2000  Date of evaluation: 3/16/25  CHIEF COMPLAINT       Chief Complaint   Patient presents with    Mental Health Problem     HISTORY OF PRESENT ILLNESS   This is a 24-year-old history of schizoaffective presenting with suicidal ideation    Feeling suicidal feels like he needs to be admitted    Denies homicidal.  Denies auditory visual hallucinations    States he does abuse heroin, crack, meth    Cannot tell me the last time he used but denies using today              REVIEW OF SYSTEMS     Review of Systems   Constitutional:  Negative for chills and fever.   HENT:  Negative for rhinorrhea and sore throat.    Eyes:  Negative for discharge and redness.   Respiratory:  Negative for chest tightness and shortness of breath.    Cardiovascular:  Negative for chest pain.   Gastrointestinal:  Negative for abdominal pain, diarrhea, nausea and vomiting.   Genitourinary:  Negative for dysuria and frequency.   Musculoskeletal:  Negative for arthralgias and myalgias.   Skin:  Negative for rash.   Neurological:  Negative for weakness and numbness.   Psychiatric/Behavioral:  Positive for suicidal ideas.    All other systems reviewed and are negative.    PASTMEDICAL HISTORY     Past Medical History:   Diagnosis Date    Psychiatric problem      Past Problem List  Patient Active Problem List   Diagnosis Code    Acute psychosis ([facility]) F23    Schizoaffective disorder, bipolar type ([facility]) F25.0    Abnormal LFTs R79.89    Depression with suicidal ideation F32.A, R45.851     SURGICAL HISTORY     History reviewed. No pertinent surgical history.  CURRENT MEDICATIONS       Previous Medications    ACETAMINOPHEN (TYLENOL) 500 MG TABLET    Take 1 tablet by mouth 4 times daily as needed for Pain    AMLODIPINE (NORVASC) 2.5 MG TABLET    Take 1 tablet by mouth daily    ARIPIPRAZOLE ER (ABILIFY MAINTENA) 400 MG SRER    Inject 400 mg into the muscle every 28 days     Decision To Admit 03/16/2025 08:26:46 PM   DISPOSITION CONDITION Stable           OUTPATIENT FOLLOW UP THE PATIENT:  No follow-up provider specified.    MD Araceli Villar Jeffrey, MD  03/16/25 5716

## 2025-03-17 NOTE — BH NOTE
Emergency Medication Follow-Up Note:    PRN medication of thorazine 50mg oral was ineffective as patient is currently exhibiting Agitated  and psychotic behavior as evidence by continues to pace, peek out of door and laugh inappropriately while sucking his thumb and making bizarre hand gestures. Patient did not sleep entire night. Staff has attempted to find and relieve the distress by Talking to patient, Refocusing on new activity, and Offering suggestions , with no success. Staff will continue to redirect and and support patient.

## 2025-03-17 NOTE — ED TRIAGE NOTES
Mode of arrival (squad #, walk in, police, etc) : Walk in        Chief complaint(s): Mental Health Problem        Arrival Note (brief scenario, treatment PTA, etc).: Pt brought in by [facility]. [facility] reports that pt is withdrawing from drugs and alcohol and is becoming more verbally aggressive towards others. Pt c/o suicidal ideation. Pt states he wants to \"rip my guts out\". Pt denies HI or hallucinations at this time. Pt A&Ox4.        C= \"Have you ever felt that you should Cut down on your drinking?\"  No  A= \"Have people Annoyed you by criticizing your drinking?\"  No  G= \"Have you ever felt bad or Guilty about your drinking?\"  No  E= \"Have you ever had a drink as an Eye-opener first thing in the morning to steady your nerves or to help a hangover?\"  No      Deferred []      Reason for deferring: N/A    *If yes to two or more: probable alcohol abuse.*

## 2025-03-17 NOTE — H&P
[facility] Internal Medicine  Donavon Wilcox MD; Simone Rosales MD, Manuel Gonzalez MD, Radha Clayton MD, Dr Sally Chew MD; Gelacio De Guzman MD    [facility] Internal Medicine   IN-PATIENT SERVICE   [facility]     HISTORY AND PHYSICAL EXAMINATION            Date:   3/17/2025  Patient name:  Ambrocio Hill  Date of admission:  3/16/2025  7:57 PM  MRN:   [id number]  Account:  [id number]  YOB: 2000  PCP:    No primary care provider on file.  Room:   [address] [place] [address]  Code Status:    Full Code      Chief Complaint:     Suicidal /Ac Psychosis    History Obtained From:     Patient/EMR/bedside RN     History of Present Illness:     24-year-old male with past medical history of polysubstance abuse hyper hypertension, has been admitted at  for further management of depression suicidal ideation  Is been hypertensive likely secondary to lung agitation was discharged on amlodipine last admission will start next    Past Medical History:     Past Medical History:   Diagnosis Date    Psychiatric problem         Past Surgical History:     History reviewed. No pertinent surgical history.     Medications Prior to Admission:     Prior to Admission medications    Medication Sig Start Date End Date Taking? Authorizing Provider   ARIPiprazole ER (ABILIFY MAINTENA) 400 MG SRER Inject 400 mg into the muscle every 28 days 3/17/25   Nabeel Muller MD   amLODIPine (NORVASC) 2.5 MG tablet Take 1 tablet by mouth daily 2/19/25   Nabeel Muller MD   lamoTRIgine (LAMICTAL) 25 MG tablet Take 1 tablet by mouth daily 2/19/25   Nabeel Muller MD   nicotine polacrilex (COMMIT) 2 MG lozenge Take 1 lozenge by mouth every hour as needed for Smoking cessation 2/18/25   Nabeel Muller MD   traZODone (DESYREL) 50 MG tablet Take 1 tablet by mouth nightly as needed for Sleep 2/18/25   Nabeel Muller MD   acetaminophen (TYLENOL) 500 MG tablet Take 1 tablet by mouth 4 times  (Principal) Depression with suicidal ideation 3/16/2025 Yes       Plan:     Admitted to inpatient psych with depression suicidal ideation  Leukocytosis likely reactive continue to monitor UA is negative  Hypertension likely second underlying agitation, started on amlodipine  Obesity BMI of 36 advised diet and exercise  Labs and medications reviewed.  DVT prophylaxis,  Pt mobile   Full code status       Consultations:   IP CONSULT TO INTERNAL MEDICINE      Radha Clayton MD  3/17/2025  11:10 AM    Copy sent to Dr. Llanos primary care provider on file.    Please note that this chart was generated using voice recognition Dragon dictation software.  Although every effort was made to ensure the accuracy of this automated transcription, some errors in transcription may have occurred.

## 2025-03-17 NOTE — H&P
Department of Psychiatry  Attending Physician Psychiatric Assessment   Patient: Ambrocio Hill  MRN: [id number]  Reason for Admission to Psychiatric Unit:  Threat to self requiring 24 hour professional observation  A mental disorder causing major disability in social, interpersonal, occupational, and/or educational functioning that is leading to dangerous or life-threatening functioning, and that can only be addressed in an acute inpatient setting   Concerns about patient's safety in the community  Past Mental Health Diagnosis: a history of  Major Depression, Schizoaffective Disorder, Anxiety Disorder, Prior suicide attempt, and Alcohol and/or Drug Use Disorder  Triggering event or precipitating factor: ... Chronic mental health issues  Length of time/duration of triggering event: Days  Legal Status: Voluntary    CHIEF COMPLAINT: Depression with suicidal ideation, psychosis    History obtained from: Patient, electronic medical record          HISTORY OF PRESENT ILLNESS:    Ambrocio Hill is a 24 y.o. male who has a past medical history of mental illness and polysubstance use and acute psychosis. Patient presented to the ED from [facility] where he has been residing for the last 20 days.  Patient endorsing suicidal thoughts with a plan to rip his insides out.  He reported not sleeping in the last couple of days.  He expressed feeling irritable with people, but denied specific homicidal ideation.  He is admitted to [facility] on a voluntary basis.    Patient is known to this facility and was last discharged from [facility] on 2/19/2025.  He received long-acting injectable aripiprazole Maintena 400 mg on 2/17 with injection due every 28 days.  Psychotropic oral medication included lamotrigine 25 mg.  He does have significant history of polysubstance abuse including cocaine, heroin and methamphetamine.  Also noted to have a history of misusing prescribed psychotropic medication via intranasal method.    On assessment,  Services  Medicare Certification     Admission Day 1  I certify that this patient's inpatient psychiatric hospital admission is medically necessary for:    x (1) treatment which could reasonably be expected to improve this patient's condition, or    x (2) diagnostic study or its equivalent.     --Neelam Romero, MARTHA - CNP on 3/17/2025 at 1:12 PM    An electronic signature was used to authenticate this note.     Please note that this chart was generated using voice recognition Dragon dictation software.  Although every effort was made to ensure the accuracy of this automated transcription, some errors in transcription may have occurred.      I independently saw and evaluated the patient.  I reviewed the nurse practitioners documentation above.  Principle diagnosis we are treating for is Schizoaffective disorder, bipolar type (HCC). Any additional comments or changes to the nurse practitioners documentation are stated below otherwise agree with assessment.  Plan will be as follows:  At the time of evaluation patient was extremely irritable.  Continuous and threatening people.  Order was placed to transition patient to intensive care unit based on symptoms and Geodon 20 mg IM once ordered.  Will order combination of Risperdal and Depakote with plan to use long-acting injectable.  Will avoid medications known to be abused by intranasal method.    Electronically signed by KENDELL العلي MD on 3/17/2025 at 2:45 PM

## 2025-03-17 NOTE — BH NOTE
Patient given tobacco quitline number 54773121069 at this time, refusing to call at this time, states \" I just dont want to quit now\"- patient given information as to the dangers of long term tobacco use. Continue to reinforce the importance of tobacco cessation.

## 2025-03-17 NOTE — GROUP NOTE
Group Therapy Note    Date: 3/17/2025    Group Start Time: 0900  Group End Time: 0936  Group Topic: Focus Group    [facility]    Shalini Perez, ADELE                     Group Therapy Note    Attendees: 7/13       Patient's Goal:  To identify a short and long term goal and steps to complete identified goal.       Notes:  Patient came to group but did not participate    Status After Intervention:  Unchanged    Participation Level: None    Participation Quality: N/A      Speech:  Did not participate      Thought Process/Content: Delusional      Affective Functioning: Flat      Mood: irritable      Level of consciousness:  Preoccupied      Response to Learning: Resistant      Endings: None Reported    Modes of Intervention: Education and Support      Discipline Responsible: Registered Nurse      Signature:  SHALINI PEREZ RN

## 2025-03-17 NOTE — PLAN OF CARE
[facility]  Initial Interdisciplinary Treatment Plan NO      Original treatment plan Date & Time: 3/17/2025 12:45    Admission Type:  Admission Type: Voluntary    Reason for admission:   Reason for Admission: Was berought into the ER by [facility]. States he has been suicidal to rip his insides out. Says he was eating dead bodies before coming in. Denies hallucinations but self talk and inappropriate laughter noted.    Estimated Length of Stay:  5-7days  Estimated Discharge Date: to be determined by physician    PATIENT STRENGTHS:  Patient Strengths:   Patient Strengths and Limitations:   Addictive Behavior: Addictive Behavior  In the Past 3 Months, Have You Felt or Has Someone Told You That You Have a Problem With  : None  Medical Problems:  Past Medical History:   Diagnosis Date    Psychiatric problem      Status EXAM:Mental Status and Behavioral Exam  Normal: No  Level of Assistance: Independent/Self  Facial Expression: Expressionless, Avoids Gaze  Affect: Unstable  Level of Consciousness: Alert  Frequency of Checks: 4 times per hour, close  Mood:Normal: No  Mood: Depressed, Angry, Anxious, Irritable  Motor Activity:Normal: Yes  Eye Contact: Poor  Observed Behavior: Preoccupied, Impulsive, Cooperative  Sexual Misconduct History: Current - no  Preception: [place] to person  Attention:Normal: No  Attention: Unable to concentrate  Thought Processes: Blocking  Thought Content:Normal: No  Thought Content: Paranoia, Preoccupations  Depression Symptoms: Increased irritability, Isolative, Sleep disturbance  Anxiety Symptoms: Generalized  [place] Symptoms: Less need to sleep, Poor judgment  Hallucinations: Auditory (comment)  Delusions: Yes  Delusions: Paranoid  Memory:Normal: No  Memory: Poor recent, Poor remote  Insight and Judgment: No  Insight and Judgment: Poor judgment, Poor insight    EDUCATION:   Learner Progress Toward Treatment Goals: reviewed group plans and strategies for  care    Method:group therapy, medication compliance, individualized assessments and care planning    Outcome: needs reinforcement    PATIENT GOALS: to be discussed with patient within 72 hours    PLAN/TREATMENT RECOMMENDATIONS:     continue group therapy , medications compliance, goal setting, individualized assessments and care, continue to monitor pt on unit      SHORT-TERM GOALS:   Time frame for Short-Term Goals: 5-7 days    LONG-TERM GOALS:  Time frame for Long-Term Goals: 6 months  Members Present in Team Meeting: See Signature Sheet    Alcira Taylor RN

## 2025-03-17 NOTE — BH NOTE
Emergency Medication Follow-Up Note:    PRN medication of Thorazine 50mg IM was ineffective as patient is currently exhibiting Agitated, Disruptive, Destructive, and Dangerous  behavior as evidence by RN. Staff has attempted to find and relieve the distress by Talking to patient, Refocusing on new activity, Offering suggestions, Checking for undiagnosed pain, and Administer PRN medications , with no success. Staff will Contact Provider, offer additional medication, provide quiet time.

## 2025-03-17 NOTE — PROGRESS NOTES
Behavioral Services  Medicare Certification Upon Admission    I certify that this patient's inpatient psychiatric hospital admission is medically necessary for:    [x] (1) Treatment which could reasonably be expected to improve this patient's condition,       [x] (2) Or for diagnostic study;     AND     [x](2) The inpatient psychiatric services are provided while the individual is under the care of a physician and are included in the individualized plan of care.    Estimated length of stay/service 4 to 7 days    Plan for post-hospital care home with outpatient community mental health follow-up    Electronically signed by KENDELL العلي MD on 3/17/2025 at 2:42 PM

## 2025-03-17 NOTE — ED NOTES
The following labs labeled with pt sticker and tubed to lab:     [x] Urine Sample  [] Stool Sample   [] Occult Sample  
stating he has not slept in \"a couple days\".  Patient reports typical appetite.      Patient denies taking any of his mental health medications because \"they won't give it to me\".  Patient reports staying at [facility] for recovery at the moment.  Patient is disorganized.  Patient is somewhat tangential.  Patient has poor insight and poor judgement.    Level of Care Disposition:    This writer consulted with Dr Knutson who recommended inpatient hospitalization for safety and stabilization.  Patient signed application for voluntary admission to [facility].

## 2025-03-17 NOTE — PROGRESS NOTES
Pharmacy Medication History Note      List of current medications patient is taking is complete.    Source of information: [facility] ([phone]), [facility] ([phone]), Epic, [facility], [employer] dispense report    Changes made to medication list:  Medications removed (include reason, ex. therapy complete or physician discontinued, noncompliance):  none    Medications flagged for provider review:  Abilify maintena 400 mg    Medications added/doses adjusted:  Added Abilify asimtufii 960 mg every 2 months  Added Vivitrol 380 mg monthly  Added Naltrexone 50 mg daily  Added Thiamine 100 mg daily  Added Hydroxyzine 50 mg every 8 hours as needed  Added Omeprazole 20 mg daily  Added Mirtazapine 7.5 mg nightly as needed  Added Melatonin 10 mg nightly as needed  Added Naltrexone 50 mg daily    Other notes (ex. Recent course of antibiotics, Coumadin dosing):  The patient received Abilify maintena 400 mg on 2/17/25 during [facility] admission. The patient had Abilify asimtufii 960 mg dispensed on 2/28/25 - left message with [facility] to confirm if patient has received any doses. Patient also had Vivitrol 380 mg dispensed on 3/6/25 - also left message to confirm administration date.      Current Home Medication List at Time of Admission:  Prior to Admission medications    Medication Sig   naltrexone (VIVITROL) 380 MG injection Inject 380 mg into the muscle every 28 days   ARIPiprazole ER (ABILIFY ASIMTUFII) 960 MG/3.2ML PRSY syringe Inject 960 mg into the muscle every 2 months   vitamin B-1 (THIAMINE) 100 MG tablet Take 1 tablet by mouth daily   hydrOXYzine HCl (ATARAX) 50 MG tablet Take 1 tablet by mouth every 8 hours as needed for Anxiety   omeprazole (PRILOSEC) 20 MG delayed release capsule Take 1 capsule by mouth daily   mirtazapine (REMERON) 7.5 MG tablet Take 1 tablet by mouth nightly as needed   melatonin 10 MG CAPS capsule Take 1 capsule by mouth nightly as needed   naltrexone (DEPADE) 50 MG  tablet Take 1 tablet by mouth daily   amLODIPine (NORVASC) 2.5 MG tablet Take 1 tablet by mouth daily   lamoTRIgine (LAMICTAL) 25 MG tablet Take 1 tablet by mouth daily   nicotine polacrilex (COMMIT) 2 MG lozenge Take 1 lozenge by mouth every hour as needed for Smoking cessation   traZODone (DESYREL) 50 MG tablet Take 1 tablet by mouth nightly as needed for Sleep   ibuprofen (ADVIL;MOTRIN) 200 MG tablet Take 3 tablets by mouth every 8 hours as needed for Pain or Fever         Please let me know if you have any questions about this encounter. Thank you!    Electronically signed by Thong Horton RPH on 3/17/2025 at 1:40 PM
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
## 2025-03-17 NOTE — GROUP NOTE
Group Therapy Note    Date: 3/17/2025    Group Start Time: 1330  Group End Time: 1415  Group Topic: Music Therapy    [facility]    Jovanny Preciado        Group Therapy Note    Attendees: 6/8     Patient's Goal:  Patients shared songs to dedicate to important people in their lives, answering questions about these people/relationship based on their music/sharing as asked by this writer. Goals to reflect on relationships; Increase sense of community; Increase socialization; Demonstrate positive use of time; Increase self-expression;    Notes:  Patient attended and participated in group having positive interactions with peers and staff throughout. Patient was pleasant and engaging, supportive of peers music and sharing, and shared a song they dedicated to \"everyone whose had to survive a dark day.\" As well as some of his family members. Patient tearful during two songs and appropriately engaged with peers about those song effecting him and making him think of family    Status After Intervention:  Improved    Participation Level: Active Listener and Interactive    Participation Quality: Appropriate, Attentive, Sharing, and Supportive      Speech:  normal      Thought Process/Content: Logical  Linear      Affective Functioning: Congruent      Mood: euthymic      Level of consciousness:  Alert and Attentive      Response to Learning: Able to verbalize current knowledge/experience and Progressing to goal    Endings: None Reported    Modes of Intervention: Support, Socialization, Exploration, Activity, Media, and Reality-testing      Discipline Responsible: Psychoeducational Specialist      Signature:  Jovanny Preciado

## 2025-03-17 NOTE — BH NOTE
[facility]  Admission Note     Admission Type:   Voluntary     Reason for admission:  Reason for Admission: Was berought into the ER by [facility]. States he has been suicidal to rip his insides out. Says he was eating dead bodies before coming in. Denies hallucinations but self talk and inappropriate laughter noted.      Addictive Behavior:   Addictive Behavior  In the Past 3 Months, Have You Felt or Has Someone Told You That You Have a Problem With  : None    Medical Problems:   Past Medical History:   Diagnosis Date    Psychiatric problem        Status EXAM:  Mental Status and Behavioral Exam  Normal: No  Level of Assistance: Independent/Self  Facial Expression: Flat, Worried  Affect: Unstable  Level of Consciousness: Alert  Frequency of Checks: 4 times per hour, close  Mood:Normal: No  Mood: Depressed, Anxious, Helpless  Motor Activity:Normal: Yes  Eye Contact: Fair  Observed Behavior: Cooperative, Preoccupied  Sexual Misconduct History: Current - no  Preception: [place] to person, [place] to time, [place] to place  Attention:Normal: No  Attention: Unable to concentrate  Thought Processes: Blocking  Thought Content:Normal: No  Thought Content: Preoccupations  Depression Symptoms: Feelings of helplessness, Feelings of hopelessess, Impaired concentration  Anxiety Symptoms: Generalized  [place] Symptoms: Poor judgment, Pressured speech  Hallucinations: Auditory (comment)  Delusions: Yes  Delusions: Paranoid  Memory:Normal: No  Memory: Poor recent, Confabulation  Insight and Judgment: No  Insight and Judgment: Poor judgment, Poor insight    Tobacco Screening:  Practical Counseling, on admission, say X, if applicable and completed (first 3 are required if patient doesn't refuse):            (x ) Recognizing danger situations (included triggers and roadblocks)                    ( x) Coping skills (new ways to manage stress,relaxation techniques, changing routine, distraction)                                                            ( x) Basic information about quitting (benefits of quitting, techniques in how to quit, available resources  ( ) [facility]                                                                                                                   ( ) Patient refused counseling  ( ) Patient has not smoked in the last 30 days    Metabolic Screening:    Lab Results   Component Value Date    LABA1C 5.5 02/16/2025       Lab Results   Component Value Date    CHOL 198 02/16/2025     Lab Results   Component Value Date    TRIG 136 02/16/2025     Lab Results   Component Value Date    HDL 38 (L) 02/16/2025     No components found for: \"LDLCAL\"  No components found for: \"LABVLDL\"      Body mass index is 36.75 kg/m².    BP Readings from Last 2 Encounters:   03/16/25 (!) 166/95   02/19/25 137/67       Pt admitted with followings belongings:  Dental Appliances: None  Vision - Corrective Lenses: None  Hearing Aid: None  Jewelry: Necklace  Body Piercings Removed: N/A  Clothing: Footwear, Pants, Jacket/Coat, Shirt, Belt, Shorts  Other Valuables: Cigarettes, Lighter/Matches  The following personal items were collected during admission. Items secured in locker/safe. Items will be returned to patient at discharge.     Josefa Butler

## 2025-03-17 NOTE — BH NOTE
Patient initially refused Norvasc 2.5 with elevated blood pressure, see FLOWSHEET. Blood pressure rechecked, still elevated, see FLOWSHEET. Patient agreed to take Norvasc 2.5 mg, medication given.

## 2025-03-17 NOTE — BH NOTE
Emergency PRN Medication Administration Note:      Patient is Agitated and Disruptive as evidence by posturing, clenching fist, pacing and actively responding. Staff attempted to find and relieve the distress by Talking to patient, Refocusing on new activity, Offering suggestions, Checking for undiagnosed pain, and Administer PRN medications Patient is currently  continuing to escalate, actively responding to internal stimuli and accepting PRN medications). Medication Administered as prescribed: Thorazine 50mg IM. Patient Tolerated medication administration.   Will continue to monitor, offer support, and reassess.
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
## 2025-03-17 NOTE — PLAN OF CARE
Problem: Coping  Goal: Pt/Family able to verbalize concerns and demonstrate effective coping strategies  Description: INTERVENTIONS:  1. Assist patient/family to identify coping skills, available support systems and cultural and spiritual values  2. Provide emotional support, including active listening and acknowledgement of concerns of patient and caregivers  3. Reduce environmental stimuli, as able  4. Instruct patient/family in relaxation techniques, as appropriate  5. Assess for spiritual pain/suffering and initiate Spiritual Care, Psychosocial Clinical Specialist consults as needed  Outcome: Not Progressing  Note: Patient requires PRN medication this shift. Patient is yelling at staff, clenching fist, posturing at staff, shoving food in mouth, inappropriately laughing and pacing. Writer attempts to educate patient on appropriate behaviors, coping skills and unit expectations with no evidence of learning. Patient does attempt to participate in goal group. Patient is unable to focus during group and unable to identify a goal.        Problem: Behavior  Goal: Pt/Family maintain appropriate behavior and adhere to behavioral management agreement, if implemented  Description: INTERVENTIONS:  1. Assess patient/family's coping skills and  non-compliant behavior (including use of illegal substances)  2. Notify security of behavior or suspected illegal substances which indicate the need for search of the family and/or belongings  3. Encourage verbalization of thoughts and concerns in a socially appropriate manner  4. Utilize positive, consistent limit setting strategies supporting safety of patient, staff and others  5. Encourage participation in the decision making process about the behavioral management agreement  6. If a visitor's behavior poses a threat to safety call refer to organization policy.  7. Initiate consult with , Psychosocial CNS, Spiritual Care as appropriate  Outcome: Not  belongings  3. Encourage verbalization of thoughts and concerns in a socially appropriate manner  4. Utilize positive, consistent limit setting strategies supporting safety of patient, staff and others  5. Encourage participation in the decision making process about the behavioral management agreement  6. If a visitor's behavior poses a threat to safety call refer to organization policy.  7. Initiate consult with , Psychosocial CNS, Spiritual Care as appropriate  Outcome: Not Progressing  Note: Patient requires PRN medication this shift. Patient is yelling at staff, clenching fist, posturing at staff, shoving food in mouth, inappropriately laughing and pacing. Writer attempts to educate patient on appropriate behaviors, coping skills and unit expectations with no evidence of learning.

## 2025-03-18 PROCEDURE — 6370000000 HC RX 637 (ALT 250 FOR IP): Performed by: PSYCHIATRY & NEUROLOGY

## 2025-03-18 PROCEDURE — 2040000000 HC PSYCH ICU R&B

## 2025-03-18 PROCEDURE — APPSS30 APP SPLIT SHARED TIME 16-30 MINUTES: Performed by: NURSE PRACTITIONER

## 2025-03-18 PROCEDURE — 99232 SBSQ HOSP IP/OBS MODERATE 35: CPT | Performed by: PSYCHIATRY & NEUROLOGY

## 2025-03-18 PROCEDURE — 6370000000 HC RX 637 (ALT 250 FOR IP): Performed by: INTERNAL MEDICINE

## 2025-03-18 RX ADMIN — AMLODIPINE BESYLATE 2.5 MG: 5 TABLET ORAL at 08:03

## 2025-03-18 RX ADMIN — HYDROXYZINE HYDROCHLORIDE 50 MG: 50 TABLET, FILM COATED ORAL at 20:32

## 2025-03-18 RX ADMIN — IBUPROFEN 400 MG: 400 TABLET, FILM COATED ORAL at 12:51

## 2025-03-18 RX ADMIN — RISPERIDONE 2 MG: 2 TABLET, ORALLY DISINTEGRATING ORAL at 08:03

## 2025-03-18 RX ADMIN — RISPERIDONE 2 MG: 2 TABLET, ORALLY DISINTEGRATING ORAL at 20:32

## 2025-03-18 RX ADMIN — TRAZODONE HYDROCHLORIDE 50 MG: 50 TABLET ORAL at 20:32

## 2025-03-18 RX ADMIN — NICOTINE POLACRILEX 2 MG: 2 LOZENGE ORAL at 08:09

## 2025-03-18 RX ADMIN — NICOTINE POLACRILEX 2 MG: 2 LOZENGE ORAL at 06:02

## 2025-03-18 RX ADMIN — NICOTINE POLACRILEX 2 MG: 2 LOZENGE ORAL at 20:07

## 2025-03-18 RX ADMIN — DIVALPROEX SODIUM 500 MG: 500 TABLET, EXTENDED RELEASE ORAL at 08:03

## 2025-03-18 RX ADMIN — HALOPERIDOL 5 MG: 5 TABLET ORAL at 03:31

## 2025-03-18 RX ADMIN — NICOTINE POLACRILEX 2 MG: 2 LOZENGE ORAL at 15:26

## 2025-03-18 RX ADMIN — ACETAMINOPHEN 650 MG: 325 TABLET, FILM COATED ORAL at 08:03

## 2025-03-18 RX ADMIN — DIVALPROEX SODIUM 500 MG: 500 TABLET, EXTENDED RELEASE ORAL at 20:32

## 2025-03-18 RX ADMIN — NICOTINE POLACRILEX 2 MG: 2 LOZENGE ORAL at 11:23

## 2025-03-18 ASSESSMENT — PAIN DESCRIPTION - DESCRIPTORS: DESCRIPTORS: ACHING

## 2025-03-18 ASSESSMENT — PAIN DESCRIPTION - LOCATION: LOCATION: FOOT;BACK

## 2025-03-18 ASSESSMENT — PAIN SCALES - GENERAL
PAINLEVEL_OUTOF10: 6
PAINLEVEL_OUTOF10: 4
PAINLEVEL_OUTOF10: 1

## 2025-03-18 NOTE — PROGRESS NOTES
Daily Progress Note  3/18/2025    Patient Name: Ambrocio Hill    CHIEF COMPLAINT: Depression with suicidal ideation, psychosis         SUBJECTIVE:      Patient required emergency oral medication Haldol 5 mg overnight around 3:30 in the morning secondary to psychomotor agitation.  Patient was not able to fall asleep and was frustrated that he was not able to remain in the day area.  Patient exhibiting increased signs of aggression.  He was lifting up his shirt and clenching his fists.  Was not redirectable.    On assessment today, patient is resting in bed.  He is rousable to verbal stimuli, but thought process is slow and speech is mumbled.  He appeared internally preoccupied with some thought blocking evident.  He was unable to provide information regarding any perceptual disturbances.  Has difficulty focusing and engaging in sustained, linear conversation. He has been compliant with his scheduled psychotropic regimen which includes Depakote 500 mg twice daily and risperidone 2 mg twice daily.  We reviewed for any potential side effects and he denies all.  No EPS symptoms observed.  Staff note that he has been selectively attending group programming and has been attempting to engage in his treatment plan.      Patient has not demonstrated stability and requires continued inpatient hospitalization for safety.    Appetite:  [x] Adequate/Unchanged  [] Increased  [] Decreased      Sleep:       [] Adequate/Unchanged  [] Fair  [x] Poor      Group Attendance on Unit:   [] Yes   [x] Selectively    [] No    Compliant with scheduled medications: [x] Yes  [] No    Received emergency medications in past 24 hrs: [x] Yes   [] No    Medication Side Effects: Denies         Mental Status Exam  Level of consciousness: Alert and awake   Appearance: Appropriate attire for setting, resting in bed, with fair  grooming and hygiene   Behavior/Motor: Approachable, engages with interviewer, no psychomotor abnormalities   Attitude toward  packet 17 g, 17 g, Oral, Daily PRN  aluminum & magnesium hydroxide-simethicone (MAALOX PLUS) 200-200-20 MG/5ML suspension 30 mL, 30 mL, Oral, Q6H PRN  nicotine polacrilex (COMMIT) lozenge 2 mg, 2 mg, Oral, Q2H PRN  traZODone (DESYREL) tablet 50 mg, 50 mg, Oral, Nightly PRN    ASSESSMENT  Schizoaffective disorder, bipolar type (HCC)         PATIENT HANDOFF  Patient symptoms are: Unstable for discharge  Monitor need and frequency of PRN medications.  Encourage participation in groups and milieu.  Medication changes and discharge planning per attending  Follow-up daily while inpatient.     Patient continues to be monitored in the inpatient psychiatric facility at [facility] for safety and stabilization. Patient continues to need, on a daily basis, active treatment furnished directly by or requiring the supervision of inpatient psychiatric personnel.    Electronically signed by MARTHA Driver CNP on 3/18/2025 at 1:09 PM    **This report has been created using voice recognition software. It may contain minor errors which are inherent in voice recognition technology.**     I independently saw and evaluated the patient.  I reviewed the nurse practitioners documentation above.  Principle diagnosis we are treating for is Schizoaffective disorder, bipolar type (HCC). Any additional comments or changes to the nurse practitioners documentation are stated below otherwise agree with assessment.  Plan will be as follows:    PLAN  Patient s symptoms   show no change  Monitor on recently started Risperdal and Depakote  Attempt to develop insight  Psycho-education conducted.  Supportive Therapy conducted.  Probable discharge is undetermined at this time  Follow-up daily while on inpatient unit    Electronically signed by KENDELL العلي MD on 3/18/2025 at 8:22 PM

## 2025-03-18 NOTE — PLAN OF CARE
Problem: Behavior  Goal: Pt/Family maintain appropriate behavior and adhere to behavioral management agreement, if implemented  Description: INTERVENTIONS:  1. Assess patient/family's coping skills and  non-compliant behavior (including use of illegal substances)  2. Notify security of behavior or suspected illegal substances which indicate the need for search of the family and/or belongings  3. Encourage verbalization of thoughts and concerns in a socially appropriate manner  4. Utilize positive, consistent limit setting strategies supporting safety of patient, staff and others  5. Encourage participation in the decision making process about the behavioral management agreement  6. If a visitor's behavior poses a threat to safety call refer to organization policy.  7. Initiate consult with , Psychosocial CNS, Spiritual Care as appropriate  Outcome: Progressing   Continues to pace the halls

## 2025-03-18 NOTE — PLAN OF CARE
Problem: Coping  Goal: Pt/Family able to verbalize concerns and demonstrate effective coping strategies  Description: INTERVENTIONS:  1. Assist patient/family to identify coping skills, available support systems and cultural and spiritual values  2. Provide emotional support, including active listening and acknowledgement of concerns of patient and caregivers  3. Reduce environmental stimuli, as able  4. Instruct patient/family in relaxation techniques, as appropriate  5. Assess for spiritual pain/suffering and initiate Spiritual Care, Psychosocial Clinical Specialist consults as needed  Outcome: Progressing     Problem: Safety - Adult  Goal: Free from fall injury  Outcome: Progressing   Patient remains free from falls. Denies intent to harm self, remains free from self harm. Support and encouragement provided

## 2025-03-18 NOTE — GROUP NOTE
Group Therapy Note    Date: 3/18/2025    Group Start Time: 1000  Group End Time: 1030  Group Topic: Psychotherapy    [facility]    Lolis Garza MSW, LORAINE        Group Therapy Note    Attendees: 2/7       Patient was offered group therapy today but declined to participate despite encouragement from staff.  1:1 was offered.       Signature:  KASSIE Nelson, LORAINE

## 2025-03-18 NOTE — GROUP NOTE
Group Therapy Note    Date: 3/18/2025    Group Start Time: 0900  Group End Time: 0930  Group Topic: Community Meeting    [facility] Lesley Salinas        Group Therapy Note    Attendees: 5/7       Patient's Goal:  \"Stay clean\"    Notes:  Goal setting    Status After Intervention:  Unchanged    Participation Level: Minimal    Participation Quality: Appropriate      Speech:  hesitant and slurred      Thought Process/Content: Logical      Affective Functioning: Flat      Mood: anxious      Level of consciousness:  Preoccupied      Response to Learning: Able to verbalize current knowledge/experience      Endings: None Reported    Modes of Intervention: Education, Support, Socialization, and Exploration      Discipline Responsible: Behavorial Health Tech      Signature:  Lesley Robles

## 2025-03-18 NOTE — GROUP NOTE
Group Therapy Note    Date: 3/18/2025    Group Start Time: 1100  Group End Time: 1119  Group Topic: Nursing    [facility] Roxann Dugan RN        Group Therapy Note    Attendees: 0/7           Notes:  Patient did not participate in group, despite staff encouragement and explanation of benefits.  Patient remain seclusive to self.  Q15 minute safety checks maintained for patient safety and will continue to encourage patient to attend unit programming.             Signature:  Roxann Peterson RN

## 2025-03-18 NOTE — BH NOTE
Po prns given as ordered  r/t po unable to remains in his room   beginning ti get upset that he needs to be in his room  pt is grinding his teeth and clenching his fists  as well as pulling at his shirt  pt is unable to be distracted from this situation and is accepting of medications without issue

## 2025-03-18 NOTE — PROGRESS NOTES
Received return call from Naomi Betancourt CNP at [place] who confirms patient last received Vivitrol 380 mg injection on 3/12/25.

## 2025-03-19 PROCEDURE — 6370000000 HC RX 637 (ALT 250 FOR IP): Performed by: PSYCHIATRY & NEUROLOGY

## 2025-03-19 PROCEDURE — 6370000000 HC RX 637 (ALT 250 FOR IP): Performed by: INTERNAL MEDICINE

## 2025-03-19 PROCEDURE — APPSS30 APP SPLIT SHARED TIME 16-30 MINUTES

## 2025-03-19 PROCEDURE — 1240000000 HC EMOTIONAL WELLNESS R&B

## 2025-03-19 PROCEDURE — 6360000002 HC RX W HCPCS: Performed by: PSYCHIATRY & NEUROLOGY

## 2025-03-19 RX ADMIN — DIVALPROEX SODIUM 500 MG: 500 TABLET, EXTENDED RELEASE ORAL at 21:19

## 2025-03-19 RX ADMIN — NICOTINE POLACRILEX 2 MG: 2 LOZENGE ORAL at 06:47

## 2025-03-19 RX ADMIN — AMLODIPINE BESYLATE 2.5 MG: 5 TABLET ORAL at 09:06

## 2025-03-19 RX ADMIN — NICOTINE POLACRILEX 2 MG: 2 LOZENGE ORAL at 16:03

## 2025-03-19 RX ADMIN — NICOTINE POLACRILEX 2 MG: 2 LOZENGE ORAL at 21:36

## 2025-03-19 RX ADMIN — RISPERIDONE 2 MG: 2 TABLET, ORALLY DISINTEGRATING ORAL at 09:06

## 2025-03-19 RX ADMIN — RISPERIDONE 2 MG: 2 TABLET, ORALLY DISINTEGRATING ORAL at 21:19

## 2025-03-19 RX ADMIN — RISPERIDONE 200 MG: 200 INJECTION, SUSPENSION, EXTENDED RELEASE SUBCUTANEOUS at 21:19

## 2025-03-19 RX ADMIN — DIVALPROEX SODIUM 500 MG: 500 TABLET, EXTENDED RELEASE ORAL at 09:06

## 2025-03-19 RX ADMIN — NICOTINE POLACRILEX 2 MG: 2 LOZENGE ORAL at 12:51

## 2025-03-19 NOTE — PROGRESS NOTES
Daily Progress Note  3/19/2025    Patient Name: Ambrocio Hill    CHIEF COMPLAINT:  Depression with suicidal ideation, psychosis           SUBJECTIVE:    Patient is seen today for a follow up assessment.  He is found in the day room.  Requests to conduct interview and current location, denies need for privacy. He does not engage in eye contact. He describes mood as \"better\".  He expresses feeling more calm. Reports decreased irritability and some improvement in sleep quality. He reports daytime tiredness.  Reports ongoing effort to spend time outside of room. He endorses improvement of suicidal ideation.  Denies homicidal ideation.  No apparent evidence of delusions.  He denies auditory or visual hallucinations. He last required emergency medications yesterday morning.  He has been without behavioral disturbances or use of emergency medications today.  He is compliant taking Depakote 500 mg twice daily and risperidone 2 mg twice daily.  No adverse effects reported.  He is transferred from PICU to adult unit today.    At this time, patient is showing some improvement however remains unstable. He  requires ongoing inpatient hospitalization for safety and stabilization.      Appetite:  [x] Adequate/Unchanged  [] Increased  [] Decreased      Sleep:       [] Adequate/Unchanged  [] showing improvement [] Poor      Group Attendance on Unit:   [] Yes   [x] Selectively    [] No    Compliant with scheduled medications: [x] Yes  [] No    Received emergency medications in past 24 hrs: [] Yes   [x] No    Medication Side Effects: Denies         Mental Status Exam  Level of consciousness: Alert and awake   Appearance: Appropriate attire for setting, seated in chair, with fair  grooming and hygiene   Behavior/Motor: Approachable, engages with interviewer, no psychomotor abnormalities   Attitude toward examiner: Cooperative, attentive, no eye contact  Speech: spontaneous, normal rate, and normal volume   Mood: \"Better\"  Affect: Mood

## 2025-03-19 NOTE — GROUP NOTE
Group Therapy Note    Date: 3/19/2025    Group Start Time: 1100  Group End Time: 1135  Group Topic: Recreational    JESSICA Jovanny Hickey    Recreation Group Note        Date: 3/19/2025   Start Time: 1100  End Time: 1135      Number of Participants in Group & Unit Census:  1/7    Topic: Patients offered a variety of group, individual, and 1:1 activities including music, art, games, and talk time.     Goal of Group: Patient goals to increase sense of community; Increase socialization; Demonstrate positive use of time; Increase self-expression;       Comments:     Patient did not participate in Recreation group, despite staff encouragement and explanation of benefits.  Patient remain seclusive to self.  Q15 minute safety checks maintained for patient safety and will continue to encourage patient to attend unit programming.

## 2025-03-19 NOTE — GROUP NOTE
Group Therapy Note    Date: 3/19/2025    Group Start Time: 1000  Group End Time: 1015  Group Topic: Nursing    [facility] P[facility] Roxann Dugan RN        Group Therapy Note    Attendees: 0/6         Notes:  Patient did not participate in group, despite staff encouragement and explanation of benefits.  Patient remain seclusive to self.  Q15 minute safety checks maintained for patient safety and will continue to encourage patient to attend unit programming.             Signature:  Roxann Peterson RN

## 2025-03-19 NOTE — BH NOTE
[facility]  Day 3 Interdisciplinary Treatment Plan NOTE    Review Date & Time: 03/19/2025 0845    Admission Type:   Admission Type: Voluntary    Reason for admission:  Reason for Admission: Was berought into the ER by [facility]. States he has been suicidal to rip his insides out. Says he was eating dead bodies before coming in. Denies hallucinations but self talk and inappropriate laughter noted.  Estimated Length of Stay:  5-7 days  Estimated Discharge Date Update:   to be determined by physician    PATIENT STRENGTHS:  Patient Strengths:   Patient Strengths and Limitations:Limitations: Difficulty problem solving/relies on others to help solve problems, Inappropriate/potentially harmful leisure interests, Multiple barriers to leisure interests, Difficult relationships / poor social skills, Demonstrates discomfort with /lack of social skills  Addictive Behavior:Addictive Behavior  In the Past 3 Months, Have You Felt or Has Someone Told You That You Have a Problem With  : None  Medical Problems:  Past Medical History:   Diagnosis Date    Psychiatric problem        Risk:  Fall Risk   Lalit Scale Lalit Scale Score: 22  BVC    Change in scores:  No Changes to plan of Care:  No    Status EXAM:   Mental Status and Behavioral Exam  Normal: No  Level of Assistance: Independent/Self  Facial Expression: Flat  Affect: Blunt  Level of Consciousness: Alert  Frequency of Checks: 4 times per hour, close  Mood:Normal: No  Mood: Anxious, Suspicious  Motor Activity:Normal: No  Motor Activity: Increased  Eye Contact: Poor  Observed Behavior: Preoccupied  Sexual Misconduct History: Past - no  Preception: Strunk to person, Strunk to place  Attention:Normal: No  Attention: Unable to concentrate  Thought Processes: Blocking  Thought Content:Normal: No  Thought Content: Poverty of content, Paranoia, Preoccupations  Depression Symptoms: Impaired concentration, Isolative, Loss of interest  Anxiety Symptoms:  Generalized  Lee Ann Symptoms: Poor judgment, Less need to sleep  Hallucinations: Auditory (comment)  Delusions: Yes  Delusions: Paranoid  Memory:Normal: No  Memory: Poor recent  Insight and Judgment: No  Insight and Judgment: Poor judgment, Poor insight    Daily Assessment Last Entry:   Daily Sleep (WDL): Exceptions to WDL            Daily Nutrition (WDL): Within Defined Limits  Level of Assistance: Independent/Self    Patient Monitoring:  Frequency of Checks: 4 times per hour, close    Psychiatric Symptoms:   Depression Symptoms  Depression Symptoms: Impaired concentration, Isolative, Loss of interest  Anxiety Symptoms  Anxiety Symptoms: Generalized  Lee Ann Symptoms  Lee Ann Symptoms: Poor judgment, Less need to sleep          Suicide Risk CSSR-S:  1) Within the past month, have you wished you were dead or wished you could go to sleep and not wake up? : Yes  2) Have you actually had any thoughts of killing yourself? : Yes  3) Have you been thinking about how you might kill yourself? : Yes  5) Have you started to work out or worked out the details of how to kill yourself? Do you intend to carry out this plan? : No  6) Have you ever done anything, started to do anything, or prepared to do anything to end your life?: No  Change in Result:   Change in Plan of care:        EDUCATION:   Learner Progress Toward Treatment Goals:   Reviewed results and recommendations of this team, Reviewed group plan and strategies, Reviewed signs, symptoms and risk of self harm and violent behavior, Reviewed goals and plan of care    Method:  small group, individual verbal education    Outcome:   Verbalized by patient but needs reinforcement to obtain goals    PATIENT GOALS:  Short term:  Patient unable to attend   Long term:      PLAN/TREATMENT RECOMMENDATIONS UPDATE:  continue with group therapies, increased socialization, continue planning for after discharge goals, continue with medication compliance    SHORT-TERM GOALS UPDATE:   Time

## 2025-03-19 NOTE — PLAN OF CARE
Problem: Behavior  Goal: Pt/Family maintain appropriate behavior and adhere to behavioral management agreement, if implemented  Description: INTERVENTIONS:  1. Assess patient/family's coping skills and  non-compliant behavior (including use of illegal substances)  2. Notify security of behavior or suspected illegal substances which indicate the need for search of the family and/or belongings  3. Encourage verbalization of thoughts and concerns in a socially appropriate manner  4. Utilize positive, consistent limit setting strategies supporting safety of patient, staff and others  5. Encourage participation in the decision making process about the behavioral management agreement  6. If a visitor's behavior poses a threat to safety call refer to organization policy.  7. Initiate consult with , Psychosocial CNS, Spiritual Care as appropriate  Outcome: Progressing   Patient took all scheduled medications. Patient is friendly and cooperative with staff. Patient allowed all assessments and vitals this morning. Patient paces halls at times.  Problem: Safety - Adult  Goal: Free from fall injury  3/19/2025 0929 by Brady Dinh LPN  Outcome: Progressing   Patient has non skid sock on. Patient understands and knows how to use call light. Every 15 minute checks maintained for patient safety.

## 2025-03-19 NOTE — PROGRESS NOTES
Pharmacy Med Education Group Note    Date: 3/19/25  Start Time: 1440  End Time: 1515    Number Participants in Group:  5/18    Goal:  Patient will demonstrate an understanding of the medication’s intended purpose and possible adverse effects  Topic: [place] for Pharmacy Med Ed Group    Discipline Responsible:     OT  AT  [facility].  RT     X Other       Participation Level:     None  Minimal      X Active Listener    X Interactive    Monopolizing         Participation Quality:    X Appropriate  Inappropriate     X       Attentive        Intrusive          Sharing        Resistant          Supportive        Lethargic       Affective:     X Congruent  Incongruent  Blunted  Flat    Constricted  Anxious  Elated  Angry    Labile  Depressed  Other         Cognitive:    X Alert  Oriented PPTP     Concentration   X G  F  P   Attention Span   X G  F  P   Short-Term Memory   X G  F  P   Long-Term Memory  G  F  P   ProblemSolving/  Decision Making  G  F  P   Ability to Process  Information   X G  F  P      Contributing Factors             Delusional             Hallucinating             Flight of Ideas             Other:       Modes of Intervention:    X Education   X Support  Exploration    Clarifying  Problem Solving  Confrontation    Socialization  Limit Setting  Reality Testing    Activity  Movement  Media    Other:            Response to Learning:    X Able to verbalize current knowledge/experience    Able to verbalize/acknowledge new learning    Able to retain information    Capable of insight    Able to change behavior    Progressing to goal    Other:        Comments:     Thong Horton, Jose, BCPS, BCPP  3/19/2025 3:15 PM  [phone]

## 2025-03-19 NOTE — GROUP NOTE
Group Therapy Note    Date: 3/18/2025    Group Start Time: 2030  Group End Time: 2031  Group Topic: Wrap-Up    [facility]    Loreto Dee, RN        Group Therapy Note    Attendees: 0/7       patient refused to attend wrap up group at 2030 after encouragement from staff.  1:1 talk time provided as alternative to group session

## 2025-03-19 NOTE — BH NOTE
Patient was transferred from PICU room 115-1 to [facility] room 228-2 with all belongings. Report given to nurse.

## 2025-03-19 NOTE — PLAN OF CARE
Problem: Coping  Goal: Pt/Family able to verbalize concerns and demonstrate effective coping strategies  Description: INTERVENTIONS:  1. Assist patient/family to identify coping skills, available support systems and cultural and spiritual values  2. Provide emotional support, including active listening and acknowledgement of concerns of patient and caregivers  3. Reduce environmental stimuli, as able  4. Instruct patient/family in relaxation techniques, as appropriate  5. Assess for spiritual pain/suffering and initiate Spiritual Care, Psychosocial Clinical Specialist consults as needed  Note: PT short with staff during one to one time. PT declined unit programming. PT paces halls aloof of peers most of shift. PT restless through out the night. PT has maintained behavioral control this shift. Pt remains free of falls and verbalizes understanding of individual fall risks.  Pt wearing non skid footwear and encouraged to seek out staff for any assistance needed.         Problem: Safety - Adult  Goal: Free from fall injury  Note: Pt remains free of falls and verbalizes understanding of individual fall risks.  Pt wearing non skid footwear and encouraged to seek out staff for any assistance needed.

## 2025-03-20 PROCEDURE — 6370000000 HC RX 637 (ALT 250 FOR IP): Performed by: PSYCHIATRY & NEUROLOGY

## 2025-03-20 PROCEDURE — 99232 SBSQ HOSP IP/OBS MODERATE 35: CPT | Performed by: INTERNAL MEDICINE

## 2025-03-20 PROCEDURE — 1240000000 HC EMOTIONAL WELLNESS R&B

## 2025-03-20 PROCEDURE — 6370000000 HC RX 637 (ALT 250 FOR IP): Performed by: INTERNAL MEDICINE

## 2025-03-20 PROCEDURE — APPSS30 APP SPLIT SHARED TIME 16-30 MINUTES

## 2025-03-20 RX ORDER — TRAZODONE HYDROCHLORIDE 50 MG/1
50 TABLET ORAL NIGHTLY PRN
Qty: 30 TABLET | Refills: 0 | Status: SHIPPED | OUTPATIENT
Start: 2025-03-20

## 2025-03-20 RX ORDER — POLYETHYLENE GLYCOL 3350 17 G
2 POWDER IN PACKET (EA) ORAL
Qty: 100 EACH | Refills: 3 | Status: SHIPPED | OUTPATIENT
Start: 2025-03-20

## 2025-03-20 RX ORDER — HYDROXYZINE HYDROCHLORIDE 50 MG/1
50 TABLET, FILM COATED ORAL 3 TIMES DAILY PRN
Qty: 30 TABLET | Refills: 0 | Status: SHIPPED | OUTPATIENT
Start: 2025-03-20 | End: 2025-03-30

## 2025-03-20 RX ORDER — DIVALPROEX SODIUM 500 MG/1
500 TABLET, FILM COATED, EXTENDED RELEASE ORAL 2 TIMES DAILY
Qty: 60 TABLET | Refills: 0 | Status: SHIPPED | OUTPATIENT
Start: 2025-03-20

## 2025-03-20 RX ORDER — AMLODIPINE BESYLATE 2.5 MG/1
2.5 TABLET ORAL DAILY
Qty: 30 TABLET | Refills: 0 | Status: SHIPPED | OUTPATIENT
Start: 2025-03-20

## 2025-03-20 RX ADMIN — TRAZODONE HYDROCHLORIDE 50 MG: 50 TABLET ORAL at 20:50

## 2025-03-20 RX ADMIN — HYDROXYZINE HYDROCHLORIDE 50 MG: 50 TABLET, FILM COATED ORAL at 10:13

## 2025-03-20 RX ADMIN — DIVALPROEX SODIUM 500 MG: 500 TABLET, EXTENDED RELEASE ORAL at 10:13

## 2025-03-20 RX ADMIN — HYDROXYZINE HYDROCHLORIDE 50 MG: 50 TABLET, FILM COATED ORAL at 20:50

## 2025-03-20 RX ADMIN — NICOTINE POLACRILEX 2 MG: 2 LOZENGE ORAL at 10:14

## 2025-03-20 RX ADMIN — NICOTINE POLACRILEX 2 MG: 2 LOZENGE ORAL at 06:59

## 2025-03-20 RX ADMIN — AMLODIPINE BESYLATE 2.5 MG: 5 TABLET ORAL at 10:14

## 2025-03-20 RX ADMIN — DIVALPROEX SODIUM 500 MG: 500 TABLET, EXTENDED RELEASE ORAL at 20:50

## 2025-03-20 RX ADMIN — NICOTINE POLACRILEX 2 MG: 2 LOZENGE ORAL at 16:28

## 2025-03-20 RX ADMIN — NICOTINE POLACRILEX 2 MG: 2 LOZENGE ORAL at 21:43

## 2025-03-20 RX ADMIN — RISPERIDONE 2 MG: 2 TABLET, ORALLY DISINTEGRATING ORAL at 10:13

## 2025-03-20 RX ADMIN — NICOTINE POLACRILEX 2 MG: 2 LOZENGE ORAL at 19:31

## 2025-03-20 NOTE — PROGRESS NOTES
His respiration is 14 and oxygen saturation is 98%.   Labs:   Admission on 03/16/2025   Component Date Value Ref Range Status    WBC 03/16/2025 11.7 (H)  3.5 - 11.0 k/uL Final    RBC 03/16/2025 5.34  4.5 - 5.9 m/uL Final    Hemoglobin 03/16/2025 15.5  13.5 - 17.5 g/dL Final    Hematocrit 03/16/2025 46.8  41 - 53 % Final    MCV 03/16/2025 87.7  80 - 100 fL Final    MCH 03/16/2025 29.1  26 - 34 pg Final    MCHC 03/16/2025 33.2  31 - 37 g/dL Final    RDW 03/16/2025 15.3 (H)  11.5 - 14.9 % Final    Platelets 03/16/2025 315  150 - 450 k/uL Final    MPV 03/16/2025 7.3  6.0 - 12.0 fL Final    Neutrophils % 03/16/2025 70 (H)  36 - 66 % Final    Lymphocytes % 03/16/2025 19 (L)  24 - 44 % Final    Monocytes % 03/16/2025 9 (H)  1 - 7 % Final    Eosinophils % 03/16/2025 1  0 - 4 % Final    Basophils % 03/16/2025 1  0 - 2 % Final    Neutrophils Absolute 03/16/2025 8.20  1.3 - 9.1 k/uL Final    Lymphocytes Absolute 03/16/2025 2.20  1.0 - 4.8 k/uL Final    Monocytes Absolute 03/16/2025 1.10  0.1 - 1.3 k/uL Final    Eosinophils Absolute 03/16/2025 0.20  0.0 - 0.4 k/uL Final    Basophils Absolute 03/16/2025 0.10  0.0 - 0.2 k/uL Final    Sodium 03/16/2025 140  136 - 145 mmol/L Final    Potassium 03/16/2025 4.1  3.7 - 5.3 mmol/L Final    Chloride 03/16/2025 106  98 - 107 mmol/L Final    CO2 03/16/2025 22  20 - 31 mmol/L Final    Anion Gap 03/16/2025 12  9 - 16 mmol/L Final    Glucose 03/16/2025 105 (H)  74 - 99 mg/dL Final    BUN 03/16/2025 16  6 - 20 mg/dL Final    Creatinine 03/16/2025 1.0  0.7 - 1.2 mg/dL Final    Est, Glom Filt Rate 03/16/2025 >90  >60 mL/min/1.73m2 Final    Comment:       These results are not intended for use in patients <18 years of age.        eGFR results are calculated without a race factor using the 2021 CKD-EPI equation.  Careful clinical correlation is recommended, particularly when comparing to results   calculated using previous equations.  The CKD-EPI equation is less accurate in patients with extremes  been created using voice recognition software. It may contain minor errors which are inherent in voice recognition technology.**     I independently saw and evaluated the patient.  I reviewed the nurse practitioners documentation above.  Principle diagnosis we are treating for is Schizoaffective disorder, bipolar type (HCC).  Any additional comments or changes to the nurse practitioners documentation are stated below otherwise agree with assessment.  Plan will be as follows:  Spent 17 minutes with the patient in supportive psychotherapy.  Patient denying side effects to medication.  Reporting improvement in mood.  Denying suicidal or homicidal ideation intent or plan.  Denying psychotic symptoms.  Forward-looking and constructive.  Discussed if continued stability or improvement through tomorrow would consider discharge and patient is in agreement    PLAN  Patient s symptoms   are improving  Continue with current medication for now  Attempt to develop insight  Psycho-education conducted.  Supportive Therapy conducted.  Probable discharge is tomorrow  Follow-up daily while on inpatient unit    Electronically signed by KENDELL العلي MD on 3/20/2025 at 8:42 PM

## 2025-03-20 NOTE — PROGRESS NOTES
[facility] Internal Medicine  Donavon Wilcox MD; Simone Rosales MD, Manuel Gonzalez MD, Radha Clayton MD, Dr Sally Chew MD; Gelacio De Guzman MD    [facility] Internal Medicine   IN-PATIENT SERVICE   [facility]     HISTORY AND PHYSICAL EXAMINATION            Date:   3/20/2025  Patient name:  Ambrocio Hill  Date of admission:  3/16/2025  7:57 PM  MRN:   [id number]  Account:  [id number]  YOB: 2000  PCP:    No primary care provider on file.  Room:   [address] [place] [address]  Code Status:    Full Code      Chief Complaint:     Suicidal /Ac Psychosis    History Obtained From:     Patient/EMR/bedside RN     History of Present Illness:     24-year-old male with past medical history of polysubstance abuse hyper hypertension, has been admitted at  for further management of depression suicidal ideation  Is been hypertensive likely secondary to lung agitation was discharged on amlodipine last admission will start next    Past Medical History:     Past Medical History:   Diagnosis Date    Psychiatric problem         Past Surgical History:     History reviewed. No pertinent surgical history.     Medications Prior to Admission:     Prior to Admission medications    Medication Sig Start Date End Date Taking? Authorizing Provider   amLODIPine (NORVASC) 2.5 MG tablet Take 1 tablet by mouth daily 3/20/25  Yes Nabeel Muller MD   divalproex (DEPAKOTE ER) 500 MG extended release tablet Take 1 tablet by mouth in the morning and at bedtime 3/20/25  Yes Nabeel Muller MD   hydrOXYzine HCl (ATARAX) 50 MG tablet Take 1 tablet by mouth 3 times daily as needed for Anxiety 3/20/25 3/30/25 Yes Nabeel Muller MD   nicotine polacrilex (COMMIT) 2 MG lozenge Take 1 lozenge by mouth every 2 hours as needed for Smoking cessation 3/20/25  Yes Nabeel Muller MD   risperiDONE ER (UZEDY) 200 MG/0.56ML DANETTE subcutaneous injection Inject 0.56 mLs into the skin every 2 months 5/18/25  MD  3/20/2025  2:02 PM    Copy sent to Dr. Llanos primary care provider on file.    Please note that this chart was generated using voice recognition Dragon dictation software.  Although every effort was made to ensure the accuracy of this automated transcription, some errors in transcription may have occurred.

## 2025-03-20 NOTE — GROUP NOTE
Group Therapy Note    Date: 3/19/2025    Group Start Time: 2000  Group End Time: 2020  Group Topic: Wrap-Up    [facility]        Group Therapy Note    Attendees: 9/17     Notes:  Patient declined to attend wrap-up group. 1:1 talk time offered as alternative      Signature:  Thaddeus Alexander RN

## 2025-03-20 NOTE — GROUP NOTE
Group Therapy Note    Date: 3/20/2025    Group Start Time: 1100  Group End Time: 1155  Group Topic: Music Therapy    [facility]    Jovanny Preciado        Group Therapy Note    Attendees: 8/18     Patient's Goal:  Patients worked together to create an uplifting music playlist, gradually going from relaxing/calm music to more uplifting/energetic music. Following all songs being played on list, patients discussed how environmental factors effect mood/energy/outlook, and daily factors that can positive impact mood/energy/outlook. Patient goals to elevate mood; Increase self-expression; Increase sense of community; Increase socialization; Demonstrate positive use of time;    Notes:  Patient attended and participated in group having positive interactions with peers and staff. Patient is pleasant and engaging throughout, contributing to group play list and engaging in reflection at end of group.     Status After Intervention:  Improved    Participation Level: Active Listener and Interactive    Participation Quality: Appropriate, Attentive, and Sharing      Speech:  normal      Thought Process/Content: Logical  Linear      Affective Functioning: Congruent      Mood: euthymic      Level of consciousness:  Alert and Attentive      Response to Learning: Able to verbalize current knowledge/experience and Progressing to goal      Endings: None Reported    Modes of Intervention: Support, Socialization, Exploration, Activity, Media, and Reality-testing      Discipline Responsible: Psychoeducational Specialist      Signature:  Jovanny Preciado

## 2025-03-20 NOTE — PLAN OF CARE
Problem: Coping  Goal: Pt/Family able to verbalize concerns and demonstrate effective coping strategies  Description: INTERVENTIONS:  1. Assist patient/family to identify coping skills, available support systems and cultural and spiritual values  2. Provide emotional support, including active listening and acknowledgement of concerns of patient and caregivers  3. Reduce environmental stimuli, as able  4. Instruct patient/family in relaxation techniques, as appropriate  5. Assess for spiritual pain/suffering and initiate Spiritual Care, Psychosocial Clinical Specialist consults as needed  Outcome: Progressing  Flowsheets (Taken 3/20/2025 1355)  Patient/family able to verbalize anxieties, fears, and concerns, and demonstrate effective coping: Provide emotional support, including active listening and acknowledgement of concerns of patient and caregivers  Note: Met with patient 1:1 , patient able to verbalize issues and express concerns to staff as needed. Patient denies any thoughts of harm to self or others and denies hallucinations. Patient encouraged to attend groups and identify ways to cope with depression and anxiety. Patient is isolative to self and paces halls when in dayroom. Patient remains in behavioral control and takes all meds as ordered.     Problem: Safety - Adult  Goal: Free from fall injury  Outcome: Progressing  Note: Patient remains free of falls and verbalizes understanding of individual fall risks. Patient is wearing non skid footwear and encouraged to seek out staff for any assistance needed.

## 2025-03-20 NOTE — PROGRESS NOTES
CLINICAL PHARMACY NOTE: MEDS TO BEDS    Total # of Prescriptions Filled: 4   The following medications were delivered to the patient:  Nicotine 2MG Lozenges   Amlodipine Besylate 2.5MG Tablets   Divalproex Sodium ER 500MG Tablets   Trazodone HCL 50MG Tablets     Additional Documentation:  Delivered to ROBERTA Fernandez RN at 4:05PM 3/20/25. Hydroxyzine too soon thru ins, placed on hold. Uzedy injection placed on hold.

## 2025-03-20 NOTE — DISCHARGE INSTRUCTIONS
Information:  Medications:   Medication summary provided   I understand that I should take only the medications on my list.     -why and when I need to take each medicine.     -which side effects to watch for.     -that I should carry my medication information at all times in case of     Emergency situations.    I will take all of my medicines to follow up appointments.     -check with my physician or pharmacist before taking any new    Medication, over the counter product or drink alcohol.    -Ask about food, drug or dietary supplement interactions.    -discard old lists and update records with medication providers.    Notify Physician:  Notify physician if you notice:   Always call 911 if you feel your life is in danger  In case of an emergency call 911 immediately!  If 911 is not available call your local emergency medical system for help    Behavioral Health Follow Up:  Original Referral Source:[facility]  Discharge Diagnosis: Suicidal ideation [R45.851]  Depression with suicidal ideation [F32.A, R45.851]  Recommendations for Level of Care: patient going to [facility] upon discharge  Patient status at discharge: stabilized on meds  My hospital  was: [facility]  Aftercare plan faxed: [facility]   -faxed by: nursing staff   -date: 3/21/25   -time: 1500  Prescriptions: filled and sent home with patient per Meds to Beds    Smoking: Quit Smoking.   Call the [employer]'s smoking quitline at 8-608-17K-QUIT  Know the signs of a heart attack   If you have any of the following symptoms call 911 immediately, do not wait more    Than five minutes.    1. Pressure, fullness and/ or squeezing in the center of the chest spreading to    The jaw, neck or shoulder.    2. Chest discomfort with light headedness, fainting, sweating, nausea or    Shortness of breath.   3. Upper abdominal pressure or discomfort.   4. Lower chest pain, back pain, unusual fatigue, shortness of breath, nausea   Or dizziness.     General  Behavioral Health  [address] [facility] [address]  [address] [facility] [address]  Phone: [phone]      Phone: [phone]    [facility]      [facility]  [address] [place] [address]    [address] [facility] [address]  Phone: [phone]      Phone: [phone]    [facility]     [facility] Department of Psychiatry  [address] [facility] [address]   [address] [place], [place] [address]  Phone: [phone]      Phone: [phone]    V[facility]       [facility]  [address] [facility] [address]     [address] [facility] [address]  Phone: [phone]      Phone: [phone]    [facility] Behavioral Health   [address] [facility] [address]    [address] [facility] [address] [facility] [address]  Phone: [phone]      Phone: [phone] Ext: 204    [facility]  [address] [facility] [address] / [address] [facility] [address] / [address] [facility] [address]  Phone: [phone]    [facility]   [facility]  [address] [facility] [address]  [address] [place] [address]  Phone: [phone]      Phone: [phone]    [facility]  [address] [facility] [address]    [address] [facility] [address]  Phone: [phone]      Phone: [phone]

## 2025-03-20 NOTE — GROUP NOTE
Group Therapy Note    Date: 3/20/2025    Group Start Time: 1000  Group End Time: 1030  Group Topic: Cognitive Skills    [facility]    Katreina Summers        Group Therapy Note    Attendees: 5/18       Patient's Goal:  identify coping skills and support people    Notes:      Status After Intervention:  Improved    Participation Level: Active Listener    Participation Quality: Appropriate      Speech:  normal      Thought Process/Content: Logical      Affective Functioning: Congruent      Mood: anxious      Level of consciousness:  Alert      Response to Learning: Able to verbalize current knowledge/experience and Progressing to goal      Endings: None Reported    Modes of Intervention: Education, Support, and Socialization      Discipline Responsible: Behavorial Health Tech      Signature:  Katerina Summers

## 2025-03-20 NOTE — PLAN OF CARE
Problem: Coping  Goal: Pt/Family able to verbalize concerns and demonstrate effective coping strategies  Description: INTERVENTIONS:  1. Assist patient/family to identify coping skills, available support systems and cultural and spiritual values  2. Provide emotional support, including active listening and acknowledgement of concerns of patient and caregivers  3. Reduce environmental stimuli, as able  4. Instruct patient/family in relaxation techniques, as appropriate  5. Assess for spiritual pain/suffering and initiate Spiritual Care, Psychosocial Clinical Specialist consults as needed  3/19/2025 2154 by Jessica Martinez LPN  Outcome: Progressing  Note: Patient is able to verbalize concerns and has displayed effective coping strategies this shift. Staff continue to monitor patient to offer support as needed.        Problem: Behavior  Goal: Pt/Family maintain appropriate behavior and adhere to behavioral management agreement, if implemented  Description: INTERVENTIONS:  1. Assess patient/family's coping skills and  non-compliant behavior (including use of illegal substances)  2. Notify security of behavior or suspected illegal substances which indicate the need for search of the family and/or belongings  3. Encourage verbalization of thoughts and concerns in a socially appropriate manner  4. Utilize positive, consistent limit setting strategies supporting safety of patient, staff and others  5. Encourage participation in the decision making process about the behavioral management agreement  6. If a visitor's behavior poses a threat to safety call refer to organization policy.  7. Initiate consult with , Psychosocial CNS, Spiritual Care as appropriate  3/19/2025 2154 by Jessica Martinez LPN  Outcome: Progressing  Note: Patient maintains appropriate behavior and adheres to behavioral management agreement at this time. Patient is encouraged by staff to verbalize thoughts/concerns in a socially appropriate  [FreeTextEntry1] : FOLLOW UP IN 2 WEEKS

## 2025-03-20 NOTE — GROUP NOTE
Group Therapy Note    Date: 3/20/2025    Group Start Time: 0900  Group End Time: 0955  Group Topic: Community Meeting    [facility]    Katerina Summers        Group Therapy Note    Attendees: 6/18     Community Meeting Group Note        Date: March 20, 2025 Start Time: 9am  End Time:  0955      Number of Participants in Group & Unit Census:  6/18    Topic: Goals    Goal of Group: Set short term goal for the day      Comments:     Patient did not participate in Community Meeting group, despite staff encouragement and explanation of benefits.  Patient remain seclusive to self.  Q15 minute safety checks maintained for patient safety and will continue to encourage patient to attend unit programming.

## 2025-03-21 VITALS
TEMPERATURE: 97.9 F | HEIGHT: 75 IN | HEART RATE: 70 BPM | SYSTOLIC BLOOD PRESSURE: 130 MMHG | BODY MASS INDEX: 36.56 KG/M2 | WEIGHT: 294 LBS | RESPIRATION RATE: 16 BRPM | OXYGEN SATURATION: 97 % | DIASTOLIC BLOOD PRESSURE: 86 MMHG

## 2025-03-21 PROCEDURE — 6370000000 HC RX 637 (ALT 250 FOR IP): Performed by: PSYCHIATRY & NEUROLOGY

## 2025-03-21 PROCEDURE — 6370000000 HC RX 637 (ALT 250 FOR IP): Performed by: INTERNAL MEDICINE

## 2025-03-21 PROCEDURE — GZHZZZZ GROUP PSYCHOTHERAPY: ICD-10-PCS | Performed by: PSYCHIATRY & NEUROLOGY

## 2025-03-21 RX ADMIN — AMLODIPINE BESYLATE 2.5 MG: 5 TABLET ORAL at 08:35

## 2025-03-21 RX ADMIN — HYDROXYZINE HYDROCHLORIDE 50 MG: 50 TABLET, FILM COATED ORAL at 08:35

## 2025-03-21 RX ADMIN — NICOTINE POLACRILEX 2 MG: 2 LOZENGE ORAL at 08:35

## 2025-03-21 RX ADMIN — DIVALPROEX SODIUM 500 MG: 500 TABLET, EXTENDED RELEASE ORAL at 08:35

## 2025-03-21 RX ADMIN — NICOTINE POLACRILEX 2 MG: 2 LOZENGE ORAL at 06:02

## 2025-03-21 NOTE — CARE COORDINATION
Discharge Arrangements:  [free text here if needed/wanted]    Guardian notified:n/a    Discharge destination/address: [address] [place] [address]    Transported by:  MARLEN    Ambrocio will admit to the dual recovery program at [facility] and receive all followup and treatment services within this program.      *CHRIS resources were offered to patient throughout admission and at time of discharge. This list of [facility] CHRIS providers was provided to patient:     [facility]  [address] [facility] [address]   [address] [facility] [address]  Phone: [phone]     Phone: [phone]    [facility].  [place]   [address] [facility] [address]   [address] [facility] [address]  Phone: [phone]     Phone: [phone]    [facility]    [facility]  [address] [facility] [address]    [address] [facility] [facility] [address]  Phone: [phone]     Phone: [phone]    [facility]     [facility]   [address] [facility] [facility] [address] [place] [address]      Phone: [phone]  Phone: [phone]        [facility]  [address] [facility] [address]   [address] [place] [address]  Phone: [phone]     Phone:  [phone]    [facility]      [facility]  [address] [facility] [address]   [address] [facility] [address]  Phone: [phone]     Phone: [phone]    [facility]  [address] [facility] [address]   [address] [facility] [address]  Phone: [phone]     Phone: [phone]    [facility]  [facility]  [address] [facility] [address] [address] [facility] [address]  Phone: [phone]     Phone: [phone]    [place] [facility]     [facility]  [address] [place] [address]

## 2025-03-21 NOTE — BH NOTE
[facility] [place]  Discharge Note    Pt discharged with followings belongings:   Dental Appliances: None  Vision - Corrective Lenses: None  Hearing Aid: None  Jewelry: Necklace  Body Piercings Removed: N/A  Clothing: Footwear, Pants, Jacket/Coat, Shirt, Belt, Shorts  Other Valuables: Cigarettes, Lighter/Matches   Valuables returned to patient. Patient educated on aftercare instructions: yes, medication education and follow up at [facility]. Information faxed to [facility] by nursing staff  at 3:15 PM .Patient verbalize understanding of AVS:  yes. Patient discharged to [facility], transported by cab.    Status EXAM upon discharge:  Mental Status and Behavioral Exam  Normal: No  Level of Assistance: Independent/Self  Facial Expression: Flat  Affect: Blunt  Level of Consciousness: Alert  Frequency of Checks: 4 times per hour, close  Mood:Normal: No  Mood: Depressed, Anxious  Motor Activity:Normal: No  Motor Activity: Repetitive acts, Increased (pacing)  Eye Contact: Fair  Observed Behavior: Hostile, Cooperative, Guarded, Preoccupied  Sexual Misconduct History: Current - no  Preception: Holt to person, Holt to place, Holt to time, Holt to situation  Attention:Normal: No  Attention: Unable to concentrate  Thought Processes: Blocking  Thought Content:Normal: No  Thought Content: Preoccupations  Depression Symptoms: Impaired concentration, Isolative, Loss of interest  Anxiety Symptoms: Generalized  [place] Symptoms: Poor judgment  Hallucinations: None  Delusions: No  Delusions: Paranoid  Memory:Normal: No  Memory: Poor recent  Insight and Judgment: No  Insight and Judgment: Poor judgment, Poor insight, Unmotivated    Tobacco Screening:  Practical Counseling, on admission, say X, if applicable and completed (first 3 are required if patient doesn't refuse):            ( ) Recognizing danger situations (included triggers and roadblocks)                    ( ) Coping skills (new ways to

## 2025-03-21 NOTE — CARE COORDINATION
Name: Ambrocio Hill    : 2000    Auth number: [id number]     Discharge Date: 3/21/2025    Destination: [facility]    *If you have any specific discharge questions, please contact the assigned /discharge planner: Farrah ([phone])       Discharge Medications:      Medication List        START taking these medications      divalproex 500 MG extended release tablet  Commonly known as: DEPAKOTE ER  Take 1 tablet by mouth in the morning and at bedtime  Notes to patient: For mood     risperiDONE  MG/0.56ML Cassy subcutaneous injection  Commonly known as: UZEDY  Inject 0.56 mLs into the skin every 2 months  Start taking on: May 18, 2025  Notes to patient: For mood/clears thoughts            CHANGE how you take these medications      hydrOXYzine HCl 50 MG tablet  Commonly known as: ATARAX  Take 1 tablet by mouth 3 times daily as needed for Anxiety  What changed: when to take this  Notes to patient: For anxiety     nicotine polacrilex 2 MG lozenge  Commonly known as: COMMIT  Take 1 lozenge by mouth every 2 hours as needed for Smoking cessation  What changed: when to take this  Notes to patient: For smoking cessation            CONTINUE taking these medications      acetaminophen 500 MG tablet  Commonly known as: TYLENOL  Take 1 tablet by mouth 4 times daily as needed for Pain  Notes to patient: For pain     amLODIPine 2.5 MG tablet  Commonly known as: NORVASC  Take 1 tablet by mouth daily  Notes to patient: For the heart, b/p     traZODone 50 MG tablet  Commonly known as: DESYREL  Take 1 tablet by mouth nightly as needed for Sleep  Notes to patient: For sleep     Vivitrol 380 MG injection  Generic drug: naltrexone  Notes to patient: For ETOH cravings            STOP taking these medications      Abilify Asimtufii 960 MG/3.2ML Prsy syringe  Generic drug: ARIPiprazole ER     ARIPiprazole  MG Srer  Commonly known as: ABILIFY MAINTENA     ibuprofen 200 MG tablet  Commonly known as:  ADVIL;MOTRIN     lamoTRIgine 25 MG tablet  Commonly known as: LAMICTAL     melatonin 10 MG Caps capsule     mirtazapine 7.5 MG tablet  Commonly known as: REMERON     omeprazole 20 MG delayed release capsule  Commonly known as: PRILOSEC     vitamin B-1 100 MG tablet  Commonly known as: THIAMINE               Where to Get Your Medications        These medications were sent to [id number] - [place], [place] - [address] [place] [address] -  [phone] - F [phone]  [address] [place] [address]      Phone: [phone]   amLODIPine 2.5 MG tablet  divalproex 500 MG extended release tablet  hydrOXYzine HCl 50 MG tablet  nicotine polacrilex 2 MG lozenge  risperiDONE  MG/0.56ML Cassy subcutaneous injection  traZODone 50 MG tablet         Follow Up Appointment: [facility]  [address] [place] [address]  Follow up on 3/21/2025  You have been approved to return to [facility] program, you will receive all services within the building/program.

## 2025-03-21 NOTE — GROUP NOTE
Group Therapy Note    Date: 3/20/2025    Group Start Time: 2100  Group End Time: 2200  Group Topic: Wrap-Up    [facility]    Marcelle Israel RN        Group Therapy Note    Attendees: 10/20       Patient's Goal:  To learn new coping skills r/t mental illness    Notes:      Status After Intervention:  Unchanged    Participation Level: Active Listener    Participation Quality: Appropriate      Speech:  normal      Thought Process/Content: Logical  Linear      Affective Functioning: Congruent      Mood: anxious      Level of consciousness:  Alert and Oriented x4      Response to Learning: Able to verbalize current knowledge/experience      Endings: None Reported    Modes of Intervention: Education and Problem-solving      Discipline Responsible: Registered Nurse      Signature:  Marcelle Israel RN

## 2025-03-21 NOTE — GROUP NOTE
Group Therapy Note    Date: 3/21/2025    Group Start Time: 1000  Group End Time: 1045  Group Topic: Psychoeducation    [facility]    Nilesh Ortega        Group Therapy Note    Attendees: 10/20     Patient was offered group therapy today but declined to participate despite encouragement from staff. 1:1 was offered.  Signature:  Nilesh Ortega

## 2025-03-21 NOTE — PLAN OF CARE
Problem: Behavior  Goal: Pt/Family maintain appropriate behavior and adhere to behavioral management agreement, if implemented  Description: INTERVENTIONS:  1. Assess patient/family's coping skills and  non-compliant behavior (including use of illegal substances)  2. Notify security of behavior or suspected illegal substances which indicate the need for search of the family and/or belongings  3. Encourage verbalization of thoughts and concerns in a socially appropriate manner  4. Utilize positive, consistent limit setting strategies supporting safety of patient, staff and others  5. Encourage participation in the decision making process about the behavioral management agreement  6. If a visitor's behavior poses a threat to safety call refer to organization policy.  7. Initiate consult with , Psychosocial CNS, Spiritual Care as appropriate  Outcome: Progressing  Flowsheets (Taken 3/21/2025 0241)  Patient/family maintains appropriate behavior and adheres to behavioral management agreement, if implemented:   Assess patient/family’s coping skills and  non-compliant behavior (including use of illegal substances)   Encourage verbalization of thoughts and concerns in a socially appropriate manner   Encourage participation in the decision making process about the behavioral management agreement  Note: Patient aloof of peers, but remains in behavioral controlled while on unit. Patient compliant with prescribed medication.     Problem: Coping  Goal: Pt/Family able to verbalize concerns and demonstrate effective coping strategies  Description: INTERVENTIONS:  1. Assist patient/family to identify coping skills, available support systems and cultural and spiritual values  2. Provide emotional support, including active listening and acknowledgement of concerns of patient and caregivers  3. Reduce environmental stimuli, as able  4. Instruct patient/family in relaxation techniques, as appropriate  5. Assess for

## 2025-03-21 NOTE — DISCHARGE INSTR - DIET

## 2025-03-21 NOTE — CARE COORDINATION
Writers spoke with Bereket Vigil,  [phone], identified that hospital received no feedback yet as to the pending referral for Ambrocio to return to [facility]. Bereket states Ambrocio has been approved by clinical review to admit to [address] [facility] [place] [address] at any time today. Writer updated unit staff.

## 2025-03-21 NOTE — TRANSITION OF CARE
Behavioral Health Transition Record    Patient Name: Ambrocio Hill  YOB: 2000   Medical Record Number: [id number]  Date of Admission: 3/16/2025  7:57 PM   Date of Discharge: 3/21/25    Attending Provider: Nabeel Muller MD   Discharging Provider: Nabeel Muller MD  To contact this individual call [phone] and ask the  to page.  If unavailable, ask to be transferred to Behavioral Health Provider on call.  A Behavioral Health Provider will be available on call 24/7 and during holidays.    Primary Care Provider: No primary care provider on file.    Allergies   Allergen Reactions    Benadryl [Diphenhydramine]      Pt reports he cannot breathe through nose       Reason for Admission: Reason for Admission to Psychiatric Unit:  Threat to self requiring 24 hour professional observation  A mental disorder causing major disability in social, interpersonal, occupational, and/or educational functioning that is leading to dangerous or life-threatening functioning, and that can only be addressed in an acute inpatient setting   Concerns about patient's safety in the community  Past Mental Health Diagnosis: a history of  Major Depression, Schizoaffective Disorder, Anxiety Disorder, Prior suicide attempt, and Alcohol and/or Drug Use Disorder  Triggering event or precipitating factor: Housing instability, Financial instability, Alcohol/Drug Relapse, and Relationship Issues  Length of time/duration of triggering event: Days  Legal Status: Voluntary    Admission Diagnosis: Suicidal ideation [R45.851]  Depression with suicidal ideation [F32.A, R45.851]    * No surgery found *    Results for orders placed or performed during the hospital encounter of 03/16/25   CBC with Auto Differential   Result Value Ref Range    WBC 11.7 (H) 3.5 - 11.0 k/uL    RBC 5.34 4.5 - 5.9 m/uL    Hemoglobin 15.5 13.5 - 17.5 g/dL    Hematocrit 46.8 41 - 53 %    MCV 87.7 80 - 100 fL    MCH 29.1 26 - 34 pg    MCHC 33.2 31 - 37 g/dL

## 2025-03-21 NOTE — DISCHARGE SUMMARY
Provider Discharge Summary     Patient ID:  Ambrocio Hill  [id number]  24 y.o.  2000    Admit date: 3/16/2025    Discharge date and time: 3/21/2025  4:15 PM     Admitting Physician: Nabeel Muller MD     Discharge Physician: Nabeel Muller MD    Admission Diagnoses: Suicidal ideation [R45.851]  Depression with suicidal ideation [F32.A, R45.851]    Discharge Diagnoses:      Schizoaffective disorder, bipolar type ([facility])     Patient Active Problem List   Diagnosis Code    Acute psychosis ([facility]) F23    Schizoaffective disorder, bipolar type ([facility]) F25.0    Abnormal LFTs R79.89    Depression with suicidal ideation F32.A, R45.851    Suicidal ideation R45.851        Admission Condition: poor    Discharged Condition: stable    Indication for Admission: threat to self    History of Present Illnes (present tense wording is of findings from admission exam and are not necessarily indicative of current findings):   Ambrocio Hill is a 24 y.o. male who has a past medical history of mental illness and polysubstance use and acute psychosis. Patient presented to the ED from [facility] where he has been residing for the last 20 days.  Patient endorsing suicidal thoughts with a plan to rip his insides out.  He reported not sleeping in the last couple of days.  He expressed feeling irritable with people, but denied specific homicidal ideation.  He is admitted to [facility] on a voluntary basis.     Patient is known to this facility and was last discharged from [facility] on 2/19/2025.  He received long-acting injectable aripiprazole Maintena 400 mg on 2/17 with injection due every 28 days.  Psychotropic oral medication included lamotrigine 25 mg.  He does have significant history of polysubstance abuse including cocaine, heroin and methamphetamine.  Also noted to have a history of misusing prescribed psychotropic medication via intranasal method.     On assessment, patient is exhibiting significant psychomotor agitation.  Observed pacing

## 2025-03-21 NOTE — BH NOTE
Patient given tobacco quitline number 16767908652 at this time, refusing to call at this time, states \" I just dont want to quit now\"- patient given information as to the dangers of long term tobacco use. Continue to reinforce the importance of tobacco cessation.